# Patient Record
Sex: FEMALE | Race: WHITE | NOT HISPANIC OR LATINO | Employment: OTHER | URBAN - METROPOLITAN AREA
[De-identification: names, ages, dates, MRNs, and addresses within clinical notes are randomized per-mention and may not be internally consistent; named-entity substitution may affect disease eponyms.]

---

## 2018-01-30 LAB
BUN SERPL-MCNC: 30 MG/DL (ref 7–18)
CALCIUM SERPL-MCNC: 9.6 MG/DL (ref 8.5–10.1)
CHLORIDE SERPL-SCNC: 105 MMOL/L (ref 98–107)
CHOLEST SERPL-MCNC: 168 MG/DL
CO2 SERPL-SCNC: 29 MMOL/L (ref 21–32)
CREAT SERPL-MCNC: 1.33 MG/DL (ref 0.6–1.3)
GLUCOSE SERPL-MCNC: 96 MG/DL (ref 74–106)
HDLC SERPL-MCNC: 67 MG/DL (ref 35–60)
LDLC SERPL CALC-MCNC: 73 MG/DL
POTASSIUM SERPL-SCNC: 4.6 MMOL/L (ref 3.5–5.1)
SODIUM SERPL-SCNC: 139 MEQ/L (ref 131–145)
TRIGL SERPL-MCNC: 94 MG/DL (ref 30–150)

## 2018-08-06 ENCOUNTER — HISTORICAL (OUTPATIENT)
Dept: ADMINISTRATIVE | Facility: HOSPITAL | Age: 73
End: 2018-08-06

## 2018-08-06 LAB
APPEARANCE, UA: CLEAR
BACTERIA #/AREA URNS AUTO: ABNORMAL /HPF
BILIRUB UR QL STRIP: NEGATIVE MG/DL
BUN SERPL-MCNC: 23 MG/DL (ref 7–18)
CALCIUM SERPL-MCNC: 9.2 MG/DL (ref 8.5–10)
CHLORIDE SERPL-SCNC: 101 MMOL/L (ref 98–107)
CO2 SERPL-SCNC: 27 MMOL/L (ref 21–32)
COLOR UR: YELLOW
CREAT SERPL-MCNC: 1.17 MG/DL (ref 0.6–1.3)
CREAT/UREA NIT SERPL: 19.7
GLUCOSE (UA): NEGATIVE MG/DL
GLUCOSE SERPL-MCNC: 77 MG/DL (ref 74–106)
HGB UR QL STRIP: ABNORMAL UNIT/L
KETONES UR QL STRIP: NEGATIVE MG/DL
LEUKOCYTE ESTERASE UR QL STRIP: NEGATIVE UNIT/L
NITRITE UR QL STRIP.AUTO: NEGATIVE
PH UR STRIP: 7.5 [PH]
POTASSIUM SERPL-SCNC: 4.2 MMOL/L (ref 3.5–5.1)
PROT UR QL STRIP: NEGATIVE MG/DL
RBC #/AREA URNS HPF: ABNORMAL /HPF
SODIUM SERPL-SCNC: 137 MMOL/L (ref 136–145)
SP GR UR STRIP: 1.01
SQUAMOUS EPITHELIAL, UA: ABNORMAL /LPF
UROBILINOGEN UR STRIP-ACNC: 0.2 MG/DL
WBC #/AREA URNS AUTO: ABNORMAL /[HPF]

## 2019-02-08 ENCOUNTER — HISTORICAL (OUTPATIENT)
Dept: ADMINISTRATIVE | Facility: HOSPITAL | Age: 74
End: 2019-02-08

## 2019-02-08 LAB
ABS NEUT (OLG): 3.1 X10(3)/MCL (ref 2.1–9.2)
ALBUMIN SERPL-MCNC: 4.1 GM/DL (ref 3.4–5)
ALBUMIN/GLOB SERPL: 1.58 {RATIO} (ref 1.5–2.5)
ALP SERPL-CCNC: 48 UNIT/L (ref 38–126)
ALT SERPL-CCNC: 19 UNIT/L (ref 7–52)
APPEARANCE, UA: CLEAR
AST SERPL-CCNC: 26 UNIT/L (ref 15–37)
BACTERIA #/AREA URNS AUTO: ABNORMAL /HPF
BILIRUB SERPL-MCNC: 0.3 MG/DL (ref 0.2–1)
BILIRUB UR QL STRIP: NEGATIVE MG/DL
BILIRUBIN DIRECT+TOT PNL SERPL-MCNC: 0 MG/DL (ref 0–0.5)
BILIRUBIN DIRECT+TOT PNL SERPL-MCNC: 0.3 MG/DL
BUN SERPL-MCNC: 23 MG/DL (ref 7–18)
CALCIUM SERPL-MCNC: 9.4 MG/DL (ref 8.5–10)
CHLORIDE SERPL-SCNC: 103 MMOL/L (ref 98–107)
CHOLEST SERPL-MCNC: 174 MG/DL (ref 0–200)
CHOLEST/HDLC SERPL: 2.8 {RATIO}
CK SERPL-CCNC: 73 UNIT/L (ref 21–232)
CO2 SERPL-SCNC: 21 MMOL/L (ref 21–32)
COLOR UR: YELLOW
CREAT SERPL-MCNC: 1.11 MG/DL (ref 0.6–1.3)
ERYTHROCYTE [DISTWIDTH] IN BLOOD BY AUTOMATED COUNT: 13 % (ref 11.5–17)
GLOBULIN SER-MCNC: 2.6 GM/DL (ref 1.2–3)
GLUCOSE (UA): NEGATIVE MG/DL
GLUCOSE SERPL-MCNC: 106 MG/DL (ref 74–106)
HCT VFR BLD AUTO: 41.7 % (ref 37–47)
HDLC SERPL-MCNC: 62 MG/DL (ref 35–60)
HGB BLD-MCNC: 13.3 GM/DL (ref 12–16)
HGB UR QL STRIP: ABNORMAL UNIT/L
KETONES UR QL STRIP: NEGATIVE MG/DL
LDLC SERPL CALC-MCNC: 76 MG/DL (ref 0–129)
LEUKOCYTE ESTERASE UR QL STRIP: ABNORMAL UNIT/L
LYMPHOCYTES # BLD AUTO: 1.6 X10(3)/MCL (ref 0.6–3.4)
LYMPHOCYTES NFR BLD AUTO: 29.6 % (ref 13–40)
MCH RBC QN AUTO: 30.4 PG (ref 27–31.2)
MCHC RBC AUTO-ENTMCNC: 32 GM/DL (ref 32–36)
MCV RBC AUTO: 95 FL (ref 80–94)
MONOCYTES # BLD AUTO: 0.7 X10(3)/MCL (ref 0.1–1.3)
MONOCYTES NFR BLD AUTO: 12.2 % (ref 0.1–24)
NEUTROPHILS NFR BLD AUTO: 58.2 % (ref 47–80)
NITRITE UR QL STRIP.AUTO: NEGATIVE
PH UR STRIP: 7 [PH]
PLATELET # BLD AUTO: 164 X10(3)/MCL (ref 130–400)
PMV BLD AUTO: 9.5 FL (ref 9.4–12.4)
POTASSIUM SERPL-SCNC: 4.4 MMOL/L (ref 3.5–5.1)
PROT SERPL-MCNC: 6.7 GM/DL (ref 6.4–8.2)
PROT UR QL STRIP: NEGATIVE MG/DL
RBC # BLD AUTO: 4.38 X10(6)/MCL (ref 4.2–5.4)
RBC #/AREA URNS HPF: ABNORMAL /HPF
SODIUM SERPL-SCNC: 140 MMOL/L (ref 136–145)
SP GR UR STRIP: 1.01
SQUAMOUS EPITHELIAL, UA: ABNORMAL /LPF
TRIGL SERPL-MCNC: 97 MG/DL (ref 30–150)
TSH SERPL-ACNC: 1.84 MIU/ML (ref 0.35–4.94)
UROBILINOGEN UR STRIP-ACNC: 0.2 MG/DL
VLDLC SERPL CALC-MCNC: 19.4 MG/DL
WBC # SPEC AUTO: 5.4 X10(3)/MCL (ref 4.5–11.5)
WBC #/AREA URNS AUTO: ABNORMAL /[HPF]

## 2019-08-08 ENCOUNTER — HISTORICAL (OUTPATIENT)
Dept: ADMINISTRATIVE | Facility: HOSPITAL | Age: 74
End: 2019-08-08

## 2019-08-08 LAB
APPEARANCE, UA: CLEAR
BACTERIA #/AREA URNS AUTO: ABNORMAL /HPF
BILIRUB UR QL STRIP: NEGATIVE MG/DL
BUN SERPL-MCNC: 25 MG/DL (ref 7–18)
CALCIUM SERPL-MCNC: 9.7 MG/DL (ref 8.5–10)
CHLORIDE SERPL-SCNC: 103 MMOL/L (ref 98–107)
CO2 SERPL-SCNC: 29 MMOL/L (ref 21–32)
COLOR UR: ABNORMAL
CREAT SERPL-MCNC: 1.26 MG/DL (ref 0.6–1.3)
CREAT/UREA NIT SERPL: 19.8
GLUCOSE (UA): NEGATIVE MG/DL
GLUCOSE SERPL-MCNC: 119 MG/DL (ref 74–106)
HGB UR QL STRIP: ABNORMAL UNIT/L
KETONES UR QL STRIP: NEGATIVE MG/DL
LEUKOCYTE ESTERASE UR QL STRIP: NEGATIVE UNIT/L
NITRITE UR QL STRIP.AUTO: NEGATIVE
PH UR STRIP: 6.5 [PH]
POTASSIUM SERPL-SCNC: 4.1 MMOL/L (ref 3.5–5.1)
PROT UR QL STRIP: NEGATIVE MG/DL
RBC #/AREA URNS HPF: ABNORMAL /HPF
SODIUM SERPL-SCNC: 138 MMOL/L (ref 136–145)
SP GR UR STRIP: <1.005
SQUAMOUS EPITHELIAL, UA: ABNORMAL /LPF
UROBILINOGEN UR STRIP-ACNC: 0.2 MG/DL
WBC #/AREA URNS AUTO: ABNORMAL /[HPF]

## 2020-03-26 ENCOUNTER — HISTORICAL (OUTPATIENT)
Dept: ADMINISTRATIVE | Facility: HOSPITAL | Age: 75
End: 2020-03-26

## 2020-03-26 LAB
ABS NEUT (OLG): 3.6 X10(3)/MCL (ref 2.1–9.2)
ALBUMIN SERPL-MCNC: 4 GM/DL (ref 3.4–5)
ALBUMIN/GLOB SERPL: 1.29 {RATIO} (ref 1.5–2.5)
ALP SERPL-CCNC: 53 UNIT/L (ref 38–126)
ALT SERPL-CCNC: 17 UNIT/L (ref 7–52)
APPEARANCE, UA: ABNORMAL
AST SERPL-CCNC: 22 UNIT/L (ref 15–37)
BACTERIA #/AREA URNS AUTO: ABNORMAL /HPF
BILIRUB SERPL-MCNC: 0.5 MG/DL (ref 0.2–1)
BILIRUB UR QL STRIP: NEGATIVE MG/DL
BILIRUBIN DIRECT+TOT PNL SERPL-MCNC: 0.1 MG/DL (ref 0–0.5)
BILIRUBIN DIRECT+TOT PNL SERPL-MCNC: 0.4 MG/DL
BUN SERPL-MCNC: 22 MG/DL (ref 7–18)
CALCIUM SERPL-MCNC: 9.3 MG/DL (ref 8.5–10)
CHLORIDE SERPL-SCNC: 103 MMOL/L (ref 98–107)
CHOLEST SERPL-MCNC: 174 MG/DL (ref 0–200)
CHOLEST/HDLC SERPL: 2.7 {RATIO}
CK SERPL-CCNC: 83 UNIT/L (ref 21–232)
CO2 SERPL-SCNC: 30 MMOL/L (ref 21–32)
COLOR UR: YELLOW
CREAT SERPL-MCNC: 1.22 MG/DL (ref 0.6–1.3)
ERYTHROCYTE [DISTWIDTH] IN BLOOD BY AUTOMATED COUNT: 13.2 % (ref 11.5–17)
GLOBULIN SER-MCNC: 3.1 GM/DL (ref 1.2–3)
GLUCOSE (UA): NEGATIVE MG/DL
GLUCOSE SERPL-MCNC: 106 MG/DL (ref 74–106)
HCT VFR BLD AUTO: 41 % (ref 37–47)
HDLC SERPL-MCNC: 65 MG/DL (ref 35–60)
HGB BLD-MCNC: 13 GM/DL (ref 12–16)
HGB UR QL STRIP: ABNORMAL UNIT/L
KETONES UR QL STRIP: NEGATIVE MG/DL
LDLC SERPL CALC-MCNC: 79 MG/DL (ref 0–129)
LEUKOCYTE ESTERASE UR QL STRIP: ABNORMAL UNIT/L
LYMPHOCYTES # BLD AUTO: 1.5 X10(3)/MCL (ref 0.6–3.4)
LYMPHOCYTES NFR BLD AUTO: 25.2 % (ref 13–40)
MCH RBC QN AUTO: 29.1 PG (ref 27–31.2)
MCHC RBC AUTO-ENTMCNC: 32 GM/DL (ref 32–36)
MCV RBC AUTO: 92 FL (ref 80–94)
MONOCYTES # BLD AUTO: 0.7 X10(3)/MCL (ref 0.1–1.3)
MONOCYTES NFR BLD AUTO: 12.3 % (ref 0.1–24)
NEUTROPHILS NFR BLD AUTO: 62.5 % (ref 47–80)
NITRITE UR QL STRIP.AUTO: NEGATIVE
PH UR STRIP: 7 [PH]
PLATELET # BLD AUTO: 171 X10(3)/MCL (ref 130–400)
PMV BLD AUTO: 9 FL (ref 9.4–12.4)
POTASSIUM SERPL-SCNC: 4.3 MMOL/L (ref 3.5–5.1)
PROT SERPL-MCNC: 7.1 GM/DL (ref 6.4–8.2)
PROT UR QL STRIP: NEGATIVE MG/DL
RBC # BLD AUTO: 4.46 X10(6)/MCL (ref 4.2–5.4)
RBC #/AREA URNS HPF: ABNORMAL /HPF
SODIUM SERPL-SCNC: 139 MMOL/L (ref 136–145)
SP GR UR STRIP: 1.02
SQUAMOUS EPITHELIAL, UA: ABNORMAL /LPF
TRIGL SERPL-MCNC: 94 MG/DL (ref 30–150)
TSH SERPL-ACNC: 2.03 MIU/ML (ref 0.35–4.94)
UROBILINOGEN UR STRIP-ACNC: 0.2 MG/DL
VLDLC SERPL CALC-MCNC: 18.8 MG/DL
WBC # SPEC AUTO: 5.8 X10(3)/MCL (ref 4.5–11.5)
WBC #/AREA URNS AUTO: ABNORMAL /[HPF]

## 2020-09-28 ENCOUNTER — HISTORICAL (OUTPATIENT)
Dept: ADMINISTRATIVE | Facility: HOSPITAL | Age: 75
End: 2020-09-28

## 2020-09-28 LAB
APPEARANCE, UA: CLEAR
BACTERIA #/AREA URNS AUTO: ABNORMAL /HPF
BILIRUB UR QL STRIP: NEGATIVE MG/DL
BUN SERPL-MCNC: 25 MG/DL (ref 7–18)
CALCIUM SERPL-MCNC: 10.3 MG/DL (ref 8.5–10)
CHLORIDE SERPL-SCNC: 99 MMOL/L (ref 98–107)
CO2 SERPL-SCNC: 30 MMOL/L (ref 21–32)
COLOR UR: YELLOW
CREAT SERPL-MCNC: 1.08 MG/DL (ref 0.6–1.3)
CREAT/UREA NIT SERPL: 23.1
GLUCOSE (UA): NEGATIVE MG/DL
GLUCOSE SERPL-MCNC: 81 MG/DL (ref 74–106)
HGB UR QL STRIP: ABNORMAL UNIT/L
KETONES UR QL STRIP: NEGATIVE MG/DL
LEUKOCYTE ESTERASE UR QL STRIP: NEGATIVE UNIT/L
NITRITE UR QL STRIP.AUTO: NEGATIVE
PH UR STRIP: 6.5 [PH]
POTASSIUM SERPL-SCNC: 4.5 MMOL/L (ref 3.5–5.1)
PROT UR QL STRIP: NEGATIVE MG/DL
RBC #/AREA URNS HPF: ABNORMAL /HPF
SODIUM SERPL-SCNC: 139 MMOL/L (ref 136–145)
SP GR UR STRIP: 1.01
SQUAMOUS EPITHELIAL, UA: ABNORMAL /LPF
UROBILINOGEN UR STRIP-ACNC: 0.2 MG/DL
WBC #/AREA URNS AUTO: ABNORMAL /[HPF]

## 2021-03-30 ENCOUNTER — HISTORICAL (OUTPATIENT)
Dept: ADMINISTRATIVE | Facility: HOSPITAL | Age: 76
End: 2021-03-30

## 2021-03-30 LAB
ABS NEUT (OLG): 3.2 X10(3)/MCL (ref 2.1–9.2)
ALBUMIN SERPL-MCNC: 4.2 GM/DL (ref 3.4–5)
ALBUMIN/GLOB SERPL: 1.68 {RATIO} (ref 1.5–2.5)
ALP SERPL-CCNC: 73 UNIT/L (ref 38–126)
ALT SERPL-CCNC: 16 UNIT/L (ref 7–52)
APPEARANCE, UA: CLEAR
AST SERPL-CCNC: 21 UNIT/L (ref 15–37)
BACTERIA #/AREA URNS AUTO: ABNORMAL /HPF
BILIRUB SERPL-MCNC: 0.6 MG/DL (ref 0.2–1)
BILIRUB UR QL STRIP: NEGATIVE MG/DL
BILIRUBIN DIRECT+TOT PNL SERPL-MCNC: 0.1 MG/DL (ref 0–0.5)
BILIRUBIN DIRECT+TOT PNL SERPL-MCNC: 0.5 MG/DL
BUN SERPL-MCNC: 17 MG/DL (ref 7–18)
CALCIUM SERPL-MCNC: 10.4 MG/DL (ref 8.5–10)
CHLORIDE SERPL-SCNC: 104 MMOL/L (ref 98–107)
CHOLEST SERPL-MCNC: 169 MG/DL (ref 0–200)
CHOLEST/HDLC SERPL: 2.6 {RATIO}
CK SERPL-CCNC: 81 UNIT/L (ref 21–232)
CO2 SERPL-SCNC: 32 MMOL/L (ref 21–32)
COLOR UR: YELLOW
CREAT SERPL-MCNC: 1.17 MG/DL (ref 0.6–1.3)
ERYTHROCYTE [DISTWIDTH] IN BLOOD BY AUTOMATED COUNT: 13.1 % (ref 11.5–17)
GLOBULIN SER-MCNC: 2.5 GM/DL (ref 1.2–3)
GLUCOSE (UA): NEGATIVE MG/DL
GLUCOSE SERPL-MCNC: 100 MG/DL (ref 74–106)
GROUP & RH: NORMAL
HCT VFR BLD AUTO: 37.6 % (ref 37–47)
HDLC SERPL-MCNC: 64 MG/DL (ref 35–60)
HGB BLD-MCNC: 12.4 GM/DL (ref 12–16)
HGB UR QL STRIP: ABNORMAL UNIT/L
KETONES UR QL STRIP: NEGATIVE MG/DL
LDLC SERPL CALC-MCNC: 73 MG/DL (ref 0–129)
LEUKOCYTE ESTERASE UR QL STRIP: NEGATIVE UNIT/L
LYMPHOCYTES # BLD AUTO: 1.3 X10(3)/MCL (ref 0.6–3.4)
LYMPHOCYTES NFR BLD AUTO: 26.9 % (ref 13–40)
MCH RBC QN AUTO: 30.2 PG (ref 27–31.2)
MCHC RBC AUTO-ENTMCNC: 33 GM/DL (ref 32–36)
MCV RBC AUTO: 92 FL (ref 80–94)
MONOCYTES # BLD AUTO: 0.5 X10(3)/MCL (ref 0.1–1.3)
MONOCYTES NFR BLD AUTO: 10.4 % (ref 0.1–24)
NEUTROPHILS NFR BLD AUTO: 62.7 % (ref 47–80)
NITRITE UR QL STRIP.AUTO: NEGATIVE
PH UR STRIP: 7 [PH]
PLATELET # BLD AUTO: 158 X10(3)/MCL (ref 130–400)
PMV BLD AUTO: 9.4 FL (ref 9.4–12.4)
POTASSIUM SERPL-SCNC: 4.1 MMOL/L (ref 3.5–5.1)
PROT SERPL-MCNC: 6.7 GM/DL (ref 6.4–8.2)
PROT UR QL STRIP: NEGATIVE MG/DL
RBC # BLD AUTO: 4.11 X10(6)/MCL (ref 4.2–5.4)
RBC #/AREA URNS HPF: ABNORMAL /HPF
SODIUM SERPL-SCNC: 141 MMOL/L (ref 136–145)
SP GR UR STRIP: 1.01
SQUAMOUS EPITHELIAL, UA: ABNORMAL /LPF
TRIGL SERPL-MCNC: 101 MG/DL (ref 30–150)
TSH SERPL-ACNC: 1.19 MIU/ML (ref 0.35–4.94)
UROBILINOGEN UR STRIP-ACNC: 0.2 MG/DL
VLDLC SERPL CALC-MCNC: 20.2 MG/DL
WBC # SPEC AUTO: 5 X10(3)/MCL (ref 4.5–11.5)
WBC #/AREA URNS AUTO: ABNORMAL /[HPF]

## 2021-09-20 ENCOUNTER — HISTORICAL (OUTPATIENT)
Dept: ADMINISTRATIVE | Facility: HOSPITAL | Age: 76
End: 2021-09-20

## 2021-09-20 LAB
APPEARANCE, UA: ABNORMAL
BACTERIA #/AREA URNS AUTO: ABNORMAL /HPF
BILIRUB UR QL STRIP: NEGATIVE MG/DL
BUN SERPL-MCNC: 27 MG/DL (ref 7–18)
CALCIUM SERPL-MCNC: 9.6 MG/DL (ref 8.5–10)
CHLORIDE SERPL-SCNC: 104 MMOL/L (ref 98–107)
CO2 SERPL-SCNC: 29 MMOL/L (ref 21–32)
COLOR UR: YELLOW
CREAT SERPL-MCNC: 1.16 MG/DL (ref 0.6–1.3)
CREAT/UREA NIT SERPL: 23.3
GLUCOSE (UA): NEGATIVE MG/DL
GLUCOSE SERPL-MCNC: 97 MG/DL (ref 74–106)
HGB UR QL STRIP: NEGATIVE UNIT/L
KETONES UR QL STRIP: ABNORMAL MG/DL
LEUKOCYTE ESTERASE UR QL STRIP: ABNORMAL UNIT/L
NITRITE UR QL STRIP.AUTO: NEGATIVE
PH UR STRIP: 5.5 [PH]
POTASSIUM SERPL-SCNC: 4.3 MMOL/L (ref 3.5–5.1)
PROT UR QL STRIP: NEGATIVE MG/DL
RBC #/AREA URNS HPF: ABNORMAL /HPF
SODIUM SERPL-SCNC: 139 MMOL/L (ref 136–145)
SP GR UR STRIP: 1.02
SQUAMOUS EPITHELIAL, UA: ABNORMAL /LPF
UROBILINOGEN UR STRIP-ACNC: 0.2 MG/DL
WBC #/AREA URNS AUTO: ABNORMAL /[HPF]

## 2022-04-10 ENCOUNTER — HISTORICAL (OUTPATIENT)
Dept: ADMINISTRATIVE | Facility: HOSPITAL | Age: 77
End: 2022-04-10

## 2022-04-26 VITALS
SYSTOLIC BLOOD PRESSURE: 142 MMHG | DIASTOLIC BLOOD PRESSURE: 80 MMHG | HEIGHT: 66 IN | BODY MASS INDEX: 23.7 KG/M2 | WEIGHT: 147.5 LBS

## 2022-05-02 NOTE — HISTORICAL [facility] CERNER
This is a historical note converted from Kumar. Formatting and pictures may have been removed.  Please reference Kumar for original formatting and attached multimedia. Chief Complaint  6 mth ov  History of Present Illness  The patient is a 76 yo white female.? ?Here in clinic for evaluation of hypertension.? The patient reports home blood pressures of admits to not monitoring. ?She had a recent cardiology visit?last week and states her blood pressure was slightly elevated, he?encouraged her?to reevaluate her blood pressure at her follow-up visit today, if continued to be elevated?would need?addressing.? The patient reports 100% compliance with medication.? The patient reports no side effects with medication use.? The patient reports following a low-sodium diet.? The patient reports some cardiovascular exercise implementation.? There is no chest pain or shortness of breath reported with exercise.  Patient has intermittent flight anxiety for which she needs a refill today-no side effects with medication use?and good efficacy reported.  She reports 100% compliance with her statin medication with no side effects. ?Following low-cholesterol diet.  ?   Dr. Palomares- Cardio  Review of Systems  Constitutional:?no weight gain,?no weight loss,?no fatigue,?no fever,?no chills,?no weakness,?no trouble sleeping.  Cardiovascular:?no chest pain or discomfort,?no tightness,?no palpitations,?no SOB with activity,?no difficulty breathing while supine,?no swelling,?no sudden awakening from sleep with SOB.  Respiratory:??no cough,?no sputum,?no coughing up blood,?no SOB,?no wheezing,?no painful breathing.  Physical Exam  Vitals & Measurements  BP:?142/80?  HT:?167.00?cm? WT:?66.900?kg? BMI:?23.99?  General- In NAD, A&O x 4  ?   Respiratory- CTA, No wheezing, No crackles, No rhonchi  ?   Cardiovascular- RRR W/O MGR, Pulses equal throughout  Assessment/Plan  1.?Hyperlipidaemia?E78.5  1. Statin medication is?efficacious with no side  effects  2. Continue medication dosing with no change-12-month prescription given  3.?Low-cholesterol diet?given and educated upon  4. FLP?drawn-to be drawn at next wellness visit  Ordered:  Clinic Follow up, *Est. 03/20/22 3:00:00 CDT, Order for future visit, Hypertension  Hyperlipidaemia  Situational anxiety, [facility] AFP  Office/Outpatient Visit Level 4 Established [id number] PC, Hypertension  Hyperlipidaemia  Situational anxiety, [facility]LINK AMB - AFP, 09/20/21 15:12:00 CDT  ?  2.?Hypertension?I10  1. ?Advocate 100% compliance?with medication regimen?and?low-sodium diet  2. ?Advocate?increased?cardiovascular exercise as tolerated and educated upon  3.? 10% weight loss goal  4.? Monitor blood pressure?daily?with?an antecubital?digital?cuff  5. ?Follow-up in 6 months for?wellness visit  6. ?Future Lab: HTN labs today  7.? Blood pressure?currently within normal limits after repeat?check, she will monitor?blood pressure readings at home for the next week and call?with?readings, parameters reviewed and discussed  Ordered:  Basic Metabolic Panel, Routine collect, 09/20/21 15:12:00 CDT, Blood, Order for future visit, Stop date 09/20/21 15:12:00 CDT, Lab Collect, Hypertension, 09/20/21 15:12:00 CDT  Clinic Follow up, *Est. 03/20/22 3:00:00 CDT, Order for future visit, Hypertension  Hyperlipidaemia  Situational anxiety, [facility] AFP  Clinic Follow-up PRN, 09/20/21 15:12:00 CDT, [facility] AMB - AFP, Future Order  Lab Collection Request, 09/20/21 15:12:00 CDT, [facility] AMB - AFP, 09/20/21 15:12:00 CDT, Hypertension  Office/Outpatient Visit Level 4 Established [id number] PC, Hypertension  Hyperlipidaemia  Situational anxiety, [facility] AMB - AFP, 09/20/21 15:12:00 CDT  Urinalysis no Reflex, Routine collect, Urine, Order for future visit, 09/20/21 15:12:00 CDT, Stop date 09/20/21 15:12:00 CDT, Nurse collect, Hypertension  ?  3.?Situational anxiety?F41.8  1. PRN use of alprazolam for flight anxiety-good efficacy and no side effects  2. We discussed  at length dependency side effects?and use only as needed-she voiced understanding  Ordered:  Clinic Follow up, *Est. 03/20/22 3:00:00 CDT, Order for future visit, Hypertension  Hyperlipidaemia  Situational anxiety, HLink AFP  Office/Outpatient Visit Level 4 Established [id number] PC, Hypertension  Hyperlipidaemia  Situational anxiety, [facility] AMB - AFP, 09/20/21 15:12:00 CDT  ?  Orders:  alPRAzolam, See Instructions, 1 tab(s) po bid prn flight anxiety, # 10 tab(s), 0 Refill(s), Pharmacy: [facility], 167, cm, Height/Length Dosing, 09/20/21 14:44:00 CDT, 66.9, kg, Weight Dosing, 09/20/21 14:44:00 CDT  amLODIPine, See Instructions, TAKE ONE TABLET ONCE DAILY, # 90 tab(s), 1 Refill(s), Pharmacy: [facility], 167, cm, Height/Length Dosing, 09/20/21 14:44:00 CDT, 66.9, kg, Weight Dosing, 09/20/21 14:44:00 CDT  hydrochlorothiazide-lisinopril, See Instructions, TAKE ONE TABLET ONCE DAILY, # 90 tab(s), 1 Refill(s), Pharmacy: [facility], 167, cm, Height/Length Dosing, 09/20/21 14:44:00 CDT, 66.9, kg, Weight Dosing, 09/20/21 14:44:00 CDT  Referrals  Clinic Follow up, *Est. 03/21/22 7:30:00 CDT, Order for future visit, Hyperlipidaemia, [facility]Link AFP  Clinic Follow-up PRN, 09/20/21 15:12:00 CDT, HLINK AMB - AFP, Future Order   Problem List/Past Medical History  Ongoing  Cardiac pacemaker  Hyperlipidaemia  Hypertension  MVP - Mitral valve prolapse  Situational anxiety  Wellness examination  Historical  No qualifying data  Procedure/Surgical History  Colonoscopy (2009)  Cardiac pacemaker  Tonsillectomy and adenoidectomy   Medications  amLODIPine 2.5 mg oral tablet, See Instructions, 1 refills  hydrochlorothiazide-lisinopril 12.5 mg-20 mg oral tablet, See Instructions, 1 refills  METOPROL SUC TAB 25MG ER, 25 mg= 1 tab(s), Oral, BID  pravastatin 20 mg oral tablet, See Instructions, 3 refills  Xanax 0.25 mg oral tablet, See Instructions  Allergies  Prevnar 13?(Swelling)  Social History  Abuse/Neglect  No, 03/30/2021  No,  08/08/2019  Tobacco  Never (less than 100 in lifetime), No, 03/30/2021  Never (less than 100 in lifetime), N/A, 08/08/2019  Never (less than 100 in lifetime), N/A, 02/08/2019  Family History  Heart disease: Father.  Immunizations  Vaccine Date Status   COVID-19 MRNA, LNP-S, PF- Pfizer 02/03/2021 Given   zoster vaccine, inactivated 01/20/2021 Recorded   COVID-19 MRNA, LNP-S, PF- Pfizer 01/13/2021 Given   zoster vaccine, inactivated 10/24/2020 Recorded   influenza virus vaccine, inactivated 10/24/2020 Recorded   tetanus-diphtheria toxoids 03/26/2020 Given   influenza virus vaccine, inactivated 11/08/2019 Recorded   influenza virus vaccine, inactivated 10/01/2018 Recorded   influenza virus vaccine, inactivated 09/29/2017 Recorded   influenza virus vaccine, inactivated 10/14/2016 Recorded   pneumococcal 13-valent conjugate vaccine 2016 Recorded   pneumococcal 23-polyvalent vaccine 2016 Recorded   zoster vaccine live 2015 Recorded   tetanus/diphtheria/pertussis, acel(Tdap) 2010 Recorded   Health Maintenance  Health Maintenance  ???Pending?(in the next year)  ??? ??OverDue  ??? ? ? ?Advance Directive due??01/02/21??and every 1??year(s)  ??? ? ? ?Cognitive Screening due??01/02/21??and every 1??year(s)  ??? ? ? ?Fall Risk Assessment due??01/02/21??and every 1??year(s)  ??? ? ? ?Functional Assessment due??01/02/21??and every 1??year(s)  ??? ? ? ?Aspirin Therapy for CVD Prevention due??03/26/21??and every 1??year(s)  ??? ??Due?  ??? ? ? ?ADL Screening due??09/20/21??and every 1??year(s)  ??? ??Due In Future?  ??? ? ? ?Depression Screening not due until??09/28/21??and every 1??year(s)  ??? ? ? ?Obesity Screening not due until??01/01/22??and every 1??year(s)  ??? ? ? ?Hypertension Management-BMP not due until??03/30/22??and every 1??year(s)  ??? ? ? ?Medicare Annual Wellness Exam not due until??03/30/22??and every 1??year(s)  ??? ? ? ?Hypertension Management-Education not due until??03/30/22??and every 1??year(s)  ??? ? ? ?Bone  Density Screening not due until??04/14/22??and every 2??year(s)  ???Satisfied?(in the past 1 year)  ??? ??Satisfied?  ??? ? ? ?Blood Pressure Screening on??09/20/21.??Satisfied by Cathie Davis NP  ??? ? ? ?Body Mass Index Check on??09/20/21.??Satisfied by Nora Harris MA  ??? ? ? ?Breast Cancer Screening on??03/15/21.??Satisfied by Jackie Orona  ??? ? ? ?Depression Screening on??09/28/20.??Satisfied by Jatin Moulton MD  ??? ? ? ?Diabetes Screening on??03/30/21.??Satisfied by Dale Fan  ??? ? ? ?Hypertension Management-Blood Pressure on??09/20/21.??Satisfied by Cathie Davis NP  ??? ? ? ?Hypertension Management-Education on??03/30/21.??Satisfied by Jatin Moulton MD  ??? ? ? ?Hypertension Management-BMP on??03/30/21.??Satisfied by Dale Fan  ??? ? ? ?Influenza Vaccine on??10/24/20.??Satisfied by Nora Harris MA  ??? ? ? ?Lipid Screening on??03/30/21.??Satisfied by Dale Fan  ??? ? ? ?Medicare Annual Wellness Exam on??03/30/21.??Satisfied by Jatin Moulton MD  ??? ? ? ?Obesity Screening on??09/20/21.??Satisfied by Nora Harris MA  ??? ? ? ?Zoster Vaccine on??01/20/21.??Satisfied by Nora Harris MA  ??? ??Refused?  ??? ? ? ?Influenza Vaccine on??09/28/20.??Recorded by Jatin Moulton MD  ?      The?physician?is present within?the office with the?nurse practitioner.??The?office visit?documentation and management have been?reviewed and agreed upon.? I will continue to follow?this patient along with?the nurse practitioner?for current and future care.

## 2022-05-02 NOTE — HISTORICAL [facility] CERNER
This is a historical note converted from [employer]. Formatting and pictures may have been removed.  Please reference Kumar for original formatting and attached multimedia. Chief Complaint  wellness  History of Present Illness  The patient is a 74 year old white female here today for a complete Wellness physical.? The patient?has?no acute complaints today. The patient also carries a diagnosis of?HTN/HLD-followed?by our clinic, which is assessed today?as stable with no new issues.??She is followed for her?sick sinus syndrome/pacemaker by cardiology Dr. Palomares. ?Exercise is reported as moderate and includes?walking primarily with no chest pain or shortness of breath.?? Diet modifications are reported as?attempting lean proteins and low sodium.?? Previous documented weight is recorded? as charted.  She is also?here in clinic for evaluation of hypertension.? The patient reports home blood pressures of 130s over 80s?at home.? The patient reports 100% compliance with medication.? The patient reports no side effects with medication use.? The patient reports following a low-sodium diet.? The patient reports some cardiovascular exercise implementation.? There is no chest pain or shortness of breath reported with exercise.  She reports 100% compliance with her statin medication with no side effects?and following a low-cholesterol diet. ?No change to regimen desired.  She follows with her cardiologist closely for history of?sick sinus syndrome/cardiac pacemaker/MVP. ?Asymptomatic today.  Review of Systems  Constitutional:?no weight gain,?no weight loss,?no fatigue,?no fever,?no chills,?no weakness,?no trouble sleeping.  Eyes:?no vision loss/changes,?no glasses or contacts,?no pain,?no redness,?no blurry or double vision,?no flashing lights,?no specks,?no glaucoma,?no cataracts.  Last eye exam:?within last 6 months  Head:?no headache,?no head injury,?no neck pain.?  Neck:??no lumps,?no swollen glands,?no stiffness.  Ears:?no  decreased hearing,?no ringing,?no earache,?no drainage.?  Nose:?no stuffiness,?no discharge,?no itching,?no hay fever,?no nosebleeds,?no sinus pain.  Throat:?no bleeding,?no dentures,?no sore tongue,?no dry mouth,?no sore throat,?no hoarseness,?no thrush,?no non-healing sores.  Cardiovascular:?no chest pain or discomfort,?no tightness,?no palpitations,?no SOB with activity,?no difficulty breathing while supine,?no swelling,?no sudden awakening from sleep with SOB.  Vascular:?no calf pain with walking,?no leg cramping.  Respiratory:??no cough,?no sputum,?no coughing up blood,?no SOB,?no wheezing,?no painful breathing.  Gastrointestinal:?no swallowing difficulty,?no heartburn,?no change in appetite,?no nausea,?no change in bowel habits,?no rectal bleeding,?no constipation,?no diarrhea,?no yellow eyes or skin.  Urinary:?no frequency,?no urgency,?no burning or pain,?no blood in urine,?no incontinence,?no change in urinary strength.  Musculoskeletal:?no muscle or joint pain,?no stiffness,?no back pain,?no redness of joints,?no swelling of joints,?no trauma.  Skin:?no rashes,?no lumps,?no itching,?no dryness,?color normal for ethnicity,?no hair or nail changes.  Neurologic:?no dizziness,?no fainting,?no seizures,?no weakness,?no numbness,?no tingling,?no tremors.  Psychiatric:?no nervousness,?no stress,?no depression,?no memory loss.  Endocrine:?no heat or cold intolerance,?no sweating,?no frequent urination,?no thirst,?no change in appetite.  Hematologic:?no ease of bruising,?no ease of bleeding.  ?  ?  ?  ?  Physical Exam  Vitals & Measurements  BP:?135/75?  BMI:?23.59?  VITAL SIGNS:? Reviewed.? ?  GENERAL:? In?no apparent distress.? Alert and Oriented x3  HEAD:?No signsof head trauma. Normocephalic  EYES:? Pupils?equal/round/reactive.? Extraocular motionsintact.  EARS:? Hearing?grossly intact. TMs and EAC?clear  MOUTH:??Oropharynx is clear.?No erythema. No exudates  NECK:? No LAD. No JVD. No thyromegaly. No  bruits  CHEST:? Chest with clear breath sounds bilaterally.? No wheezes, rales, or rhonchi. Good air movement  CARDIAC:? Regular rate and rhythm.? S1 and S2, without murmurs, gallops, or rubs.  VASCULAR:??No Edema.? Peripheral pulses normal and equal in all extremities.  ABDOMEN:?Soft, without detectable tenderness.??No sign of distention.?No rebound or guarding, and no masses palpated.? ?Bowel Sounds present and normal x 4.  MUSCULOSKELETAL:??Good range of motion of all major joints.?5/5 strength throughout. Extremities without clubbing, cyanosis or edema.  NEUROLOGIC EXAM:? Alert and oriented x 3.? No focal sensory or strength deficits.? ?Speech normal.? Follows commands.  PSYCHIATRIC:? Mood normal.  SKIN:??No rash or lesions.  Assessment/Plan  1.?Wellness examination?Z00.00  ?Assessment/Plan:  ?   1.?Wellness  ?   -Health and Exercise Prescription issued and educated upon  ?   -10% weight loss goal  ?   -Lifestyle counseling >20minutes  ?   -Diet: Low-sodium/cardiac  ?   -Screening: GYN screening-breast/Pap/mammogram up-to-date with Dr. Rollins-we will request records.? She is actually done with Pap per age?and guidelines but she will continue to acquire her Pap with her GYN as long as he recommends it. ?Colonoscopy done per age.? We will inquire with the patient regarding DEXA scan-if she has not got 1, we will order it.  ?   -Vaccines: TD vaccine with ABN today; Shingrix No. 1 and #2 sent to [facility]  ?   -Labs:?See below  ?   2.Comorbidities:?See below  ?  3. Referrals:?None  ?  4. RTC: 12-month wellness and 6-month HTN  ?  Ordered:  Medicare Annual Wellness- Subsequent  PC, Wellness examination  Hypertension  Hyperlipidaemia, [facility]LINK AMB - AFP, 03/26/20 8:37:00 CDT  ?  2.?Hypertension?I10  ?  Essential Hypertension:?At goal per?JNC 8 guidelines.? No change in medication regimen. ?6-month prescription written-see below  ?   1. ?Advocate 100% compliance?with medication regimen?and?low-sodium  diet  2. ?Advocate?increased?cardiovascular exercise as tolerated and educated upon  3.? 10% weight loss goal  4.? Monitor blood pressure?daily?with?an antecubital?digital?cuff  5. ?Follow-up in 6 months for?HTN visit  6. ?Future Lab: HTN labs in 6 months  ?  ?  ?  ?  Ordered:  amLODIPine, 2.5 mg = 1 tab(s), Oral, Daily, # 90 tab(s), 1 Refill(s), Pharmacy: [facility], 167, cm, Height/Length Dosing, 08/08/19 8:55:00 CDT, 67, kg, Weight Dosing, 08/08/19 8:55:00 CDT  hydrochlorothiazide-lisinopril, 1 tab(s), Oral, Daily, # 90 tab(s), 1 Refill(s), Pharmacy: [facility], 167, cm, Height/Length Dosing, 08/08/19 8:55:00 CDT, 67, kg, Weight Dosing, 08/08/19 8:55:00 CDT  CBC w/ Auto Diff, Routine collect, 03/26/20 8:37:00 CDT, Blood, Order for future visit, Stop date 03/26/20 8:37:00 CDT, Lab Collect, Hypertension  Hyperlipidaemia, 03/26/20 8:37:00 CDT  Clinic Follow up, *Est. 09/26/20 3:00:00 CDT, Order for future visit, Hypertension  Hyperlipidaemia, HLink AFP  Comprehensive Metabolic Panel, Routine collect, 03/26/20 8:37:00 CDT, Blood, Order for future visit, Stop date 03/26/20 8:37:00 CDT, Lab Collect, Hypertension  Hyperlipidaemia, 03/26/20 8:37:00 CDT  Lab Collection Request, 03/26/20 8:39:00 CDT, HLINK AMB - AFP, 03/26/20 8:39:00 CDT, Hypertension  Hyperlipidaemia  Lipid Panel, Routine collect, *Est. 03/26/20 3:00:00 CDT, Blood, Order for future visit, *Est. Stop date 03/26/20 3:00:00 CDT, Lab Collect, Hypertension  Hyperlipidaemia, 03/26/20 8:37:00 CDT  Medicare Annual Wellness- Subsequent  PC, Wellness examination  Hypertension  Hyperlipidaemia, HLINK AMB - AFP, 03/26/20 8:37:00 CDT  Thyroid Stimulating Hormone, Routine collect, 03/26/20 8:37:00 CDT, Blood, Order for future visit, Stop date 03/26/20 8:37:00 CDT, Lab Collect, Hypertension, 03/26/20 8:37:00 CDT  Urinalysis without Reflex, Routine collect, Urine, Order for future visit, 03/26/20 8:37:00 CDT, Stop date 03/26/20 8:37:00 CDT, Nurse  collect, Hypertension  ?  3.?Hyperlipidaemia?E78.5  ??-Statin medication is?efficacious with no side effects  -Continue medication dosing with no change-12-month prescription given  -Low-cholesterol diet?given and educated upon  -FLP?[id number]  Ordered:  pravastatin, 20 mg = 1 tab(s), Oral, Once a day (at bedtime), # 90 tab(s), 3 Refill(s), Pharmacy: [facility], 167, cm, Height/Length Dosing, 08/08/19 8:55:00 CDT, 67, kg, Weight Dosing, 08/08/19 8:55:00 CDT  CBC w/ Auto Diff, Routine collect, 03/26/20 8:37:00 CDT, Blood, Order for future visit, Stop date 03/26/20 8:37:00 CDT, Lab Collect, Hypertension  Hyperlipidaemia, 03/26/20 8:37:00 CDT  Clinic Follow up, *Est. 09/26/20 3:00:00 CDT, Order for future visit, Hypertension  Hyperlipidaemia, HLink AFP  Comprehensive Metabolic Panel, Routine collect, 03/26/20 8:37:00 CDT, Blood, Order for future visit, Stop date 03/26/20 8:37:00 CDT, Lab Collect, Hypertension  Hyperlipidaemia, 03/26/20 8:37:00 CDT  Creatine Kinase, Routine collect, 03/26/20 8:37:00 CDT, Blood, Order for future visit, Stop date 03/26/20 8:37:00 CDT, Lab Collect, Hyperlipidaemia, 03/26/20 8:37:00 CDT  Lab Collection Request, 03/26/20 8:39:00 CDT, HLINK AMB - AFP, 03/26/20 8:39:00 CDT, Hypertension  Hyperlipidaemia  Lipid Panel, Routine collect, *Est. 03/26/20 3:00:00 CDT, Blood, Order for future visit, *Est. Stop date 03/26/20 3:00:00 CDT, Lab Collect, Hypertension  Hyperlipidaemia, 03/26/20 8:37:00 CDT  Medicare Annual Wellness- Subsequent  PC, Wellness examination  Hypertension  Hyperlipidaemia, HLINK AMB - AFP, 03/26/20 8:37:00 CDT  ?  Orders:  tetanus-diphth toxoids (Td) adult/adol, 0.5 mL, IM, Once, first dose 03/26/20 8:37:00 CDT, stop date 03/26/20 8:37:00 CDT  -Follow-up with cardiology for pacemaker and history of sick sinus syndrome.  Referrals  Clinic Follow up, *Est. 09/26/20 3:00:00 CDT, Order for future visit, Hypertension  Hyperlipidaemia, HLink AFP   Problem  List/Past Medical History  Ongoing  Cardiac pacemaker  Hyperlipidaemia  Hypertension  MVP - Mitral valve prolapse  Wellness examination  Historical  No qualifying data  Procedure/Surgical History  Colonoscopy (2009)  Cardiac pacemaker  Tonsillectomy and adenoidectomy   Medications  alPRAzolam 0.25 mg oral tab, 0.25 mg= 1 tab(s), Oral, Daily  amLODIPine 2.5 mg oral tablet, 2.5 mg= 1 tab(s), Oral, Daily, 1 refills  hydrochlorothiazide-lisinopril 12.5 mg-20 mg oral tablet, 1 tab(s), Oral, Daily, 1 refills  METOPROL SUC TAB 25MG ER, 25 mg= 1 tab(s), Oral, BID  pravastatin 20 mg oral tablet, 20 mg= 1 tab(s), Oral, Once a day (at bedtime), 3 refills  Allergies  Prevnar 13?(Swelling)  Social History  Abuse/Neglect  No, 08/08/2019  Tobacco  Never (less than 100 in lifetime), N/A, 08/08/2019  Never (less than 100 in lifetime), N/A, 02/08/2019  Family History  Heart disease: Father.  Immunizations  Vaccine Date Status   tetanus-diphtheria toxoids 03/26/2020 Given   influenza virus vaccine, inactivated 10/01/2018 Recorded   influenza virus vaccine, inactivated 09/29/2017 Recorded   influenza virus vaccine, inactivated 10/14/2016 Recorded   pneumococcal 13-valent conjugate vaccine 2016 Recorded   pneumococcal 23-polyvalent vaccine 2016 Recorded   zoster vaccine live 2015 Recorded   tetanus/diphtheria/pertussis, acel(Tdap) 2010 Recorded   Health Maintenance  Health Maintenance  ???Pending?(in the next year)  ??? ??OverDue  ??? ? ? ?Advance Directive due??01/01/20??and every 1??year(s)  ??? ? ? ?Cognitive Screening due??01/01/20??and every 1??year(s)  ??? ? ? ?Fall Risk Assessment due??01/01/20??and every 1??year(s)  ??? ? ? ?Functional Assessment due??01/01/20??and every 1??year(s)  ??? ? ? ?Geriatric Depression Screening due??01/01/20??and every 1??year(s)  ??? ??Due?  ??? ? ? ?ADL Screening due??03/26/20??and every 1??year(s)  ??? ? ? ?Bone Density Screening due??03/26/20??Variable frequency  ??? ??Due In Future?  ??? ? ?  ?Hypertension Management-BMP not due until??08/07/20??and every 1??year(s)  ??? ? ? ?Obesity Screening not due until??01/01/21??and every 1??year(s)  ???Satisfied?(in the past 1 year)  ??? ??Satisfied?  ??? ? ? ?Aspirin Therapy for CVD Prevention on??03/26/20.??Satisfied by Jatin Moulton MD  ??? ? ? ?Blood Pressure Screening on??03/26/20.??Satisfied by Jatin Moulton MD  ??? ? ? ?Body Mass Index Check on??03/26/20.??Satisfied by Nora Harris MA  ??? ? ? ?Diabetes Screening on??08/08/19.??Satisfied by Lucía Mesa  ??? ? ? ?Hypertension Management-Education on??03/26/20.??Satisfied by Jatin Moulton MD  ??? ? ? ?Medicare Annual Wellness Exam on??03/26/20.??Satisfied by Jatin Moulton MD  ??? ? ? ?Obesity Screening on??03/26/20.??Satisfied by Nora Harris MA  ??? ? ? ?Tetanus Vaccine on??03/26/20.??Satisfied by Nora Harris MA  ?

## 2022-05-02 NOTE — HISTORICAL [facility] CERNER
This is a historical note converted from [employer]. Formatting and pictures may have been removed.  Please reference Kumar for original formatting and attached multimedia. Chief Complaint  wellness  History of Present Illness  The patient is a 73 year old white female here today for a complete Wellness physical.? The patient?has?no acute complaints today. The patient also carries a diagnosis of?HTN/HLD/situational anxiety, which is assessed today.? Exercise is reported as moderate and includes?walking and weights with no chest pain or shortness of breath.?? Diet modifications are reported as?lean proteins of vegetables and low-sodium low-cholesterol.?? Previous documented weight is recorded? as charted.  She is also ??here in clinic for evaluation of hypertension.? The patient reports home blood pressures of 120s over 70s.? The patient reports 100% compliance with medication.? The patient reports no side effects with medication use.? The patient reports following a low-sodium diet.? The patient reports some cardiovascular exercise implementation.? There is no chest pain or shortness of breath reported with exercise.  The patient reports 100% compliance with statin medication and hyperlipidemic diet with no side effects. ?The patient reports only as needed use of Xanax for situational anxiety with no side effects and good efficacy.  Review of Systems  Constitutional:?no weight gain,?no weight loss,?no fatigue,?no fever,?no chills,?no weakness,?no trouble sleeping.  Eyes:?no vision loss/changes,?no glasses or contacts,?no pain,?no redness,?no blurry or double vision,?no flashing lights,?no specks,?no glaucoma,?no cataracts.  Last eye exam:?within last 6 months  Head:?no headache,?no head injury,?no neck pain.?  Neck:??no lumps,?no swollen glands,?no stiffness.  Ears:?no decreased hearing,?no ringing,?no earache,?no drainage.?  Nose:?no stuffiness,?no discharge,?no itching,?no hay fever,?no nosebleeds,?no sinus  pain.  Throat:?no bleeding,?no dentures,?no sore tongue,?no dry mouth,?no sore throat,?no hoarseness,?no thrush,?no non-healing sores.  Cardiovascular:?no chest pain or discomfort,?no tightness,?no palpitations,?no SOB with activity,?no difficulty breathing while supine,?no swelling,?no sudden awakening from sleep with SOB.  Vascular:?no calf pain with walking,?no leg cramping.  Respiratory:??no cough,?no sputum,?no coughing up blood,?no SOB,?no wheezing,?no painful breathing.  Gastrointestinal:?no swallowing difficulty,?no heartburn,?no change in appetite,?no nausea,?no change in bowel habits,?no rectal bleeding,?no constipation,?no diarrhea,?no yellow eyes or skin.  Urinary:?no frequency,?no urgency,?no burning or pain,?no blood in urine,?no incontinence,?no change in urinary strength.  Musculoskeletal:?no muscle or joint pain,?no stiffness,?no back pain,?no redness of joints,?no swelling of joints,?no trauma.  Skin:?no rashes,?no lumps,?no itching,?no dryness,?color normal for ethnicity,?no hair or nail changes.  Neurologic:?no dizziness,?no fainting,?no seizures,?no weakness,?no numbness,?no tingling,?no tremors.  Psychiatric:?no nervousness,?no stress,?no depression,?no memory loss.  Endocrine:?no heat or cold intolerance,?no sweating,?no frequent urination,?no thirst,?no change in appetite.  Hematologic:?no ease of bruising,?no ease of bleeding.  ?  ?  ?  ?  Physical Exam  Vitals & Measurements  BP:?132/80?  HT:?167?cm? WT:?67.2?kg? BMI:?24.1?  VITAL SIGNS:? Reviewed.? ?  GENERAL:? In?no apparent distress.? Alert and Oriented x3  HEAD:?No signsof head trauma. Normocephalic  EYES:? Pupils?equal/round/reactive.? Extraocular motionsintact.  EARS:? Hearing?grossly intact. TMs and EAC?clear  MOUTH:??Oropharynx is clear.?No erythema. No exudates  NECK:? No LAD. No JVD. No thyromegaly. No bruits  CHEST:? Chest with clear breath sounds bilaterally.? No wheezes, rales, or rhonchi. Good air movement  CARDIAC:? Regular  rate and rhythm.? S1 and S2, without murmurs, gallops, or rubs.  VASCULAR:??No Edema.? Peripheral pulses normal and equal in all extremities.  ABDOMEN:?Soft, without detectable tenderness.??No sign of distention.?No rebound or guarding, and no masses palpated.? ?Bowel Sounds present and normal x 4.  MUSCULOSKELETAL:??Good range of motion of all major joints.?5/5 strength throughout. Extremities without clubbing, cyanosis or edema.  NEUROLOGIC EXAM:? Alert and oriented x 3.? No focal sensory or strength deficits.? ?Speech normal.? Follows commands.  PSYCHIATRIC:? Mood normal.  SKIN:??No rash or lesions.  Assessment/Plan  1.?Wellness examination?Z00.00  ?Assessment/Plan:  ?   1.?Wellness  -Health and Exercise Prescription issued and educated upon  -10% weight loss goal  -Lifestyle counseling >20minutes  -Diet:?Lean proteins/low-sodium/low-cholesterol  -Screening:?Up-to-date-GYN provider has her up-to-date on all GYN; colonoscopy done/complete  -Vaccines:?Shing Grix when available-patient defers currently  -Labs:?See below  ?   2.Comorbidities:?See below  ?   3. Referrals:?None  ?   4. RTC:?12-month wellness and 6-month HTN  ?  Ordered:   Medicare Subsequent Wellness PC[facility] 02/08[facility] 8:34:00 CST  ?  2.?Hypertension?I10  ?  Essential Hypertension: At goal per JNC 8 guidelines/no change to medication regimen/six-month prescription written-see below  ?   1. ?Advocate 100% compliance?with medication regimen?and?low-sodium diet  2. ?Advocate?increased?cardiovascular exercise as tolerated and educated upon  3.? 10% weight loss goal  4.? Monitor blood pressure?daily?with?an antecubital?digital?cuff  5. ?Follow-up in 6 months for?HTN visit  6. ?Future Lab:?HTN labs in 6 months  ?  ?  ?  ?  Ordered:  amLODIPine, 2.5 mg = 1 tab(s), Oral, Daily, # 90 tab(s), 1 Refill(s), Pharmacy: [facility] [place]  hydrochlorothiazide-lisinopril, 1 tab(s), Oral, Daily, # 90 tab(s), 1  Refill(s), Pharmacy: [facility] [place]  Clinic Follow up, *Est. 08/08/19 9:00:00 CDT, Order for future visit, Hypertension, [facility]  Comprehensive Metabolic Panel, Routine collect, 02/08/19 8:41:00 CST, Blood, Stop date 02/08/19 8:41:00 CST, Lab Collect, Hypertension, 02/08/19 8:41:00 CST  Thyroid Stimulating Hormone, Routine collect, 02/08/19 8:41:00 CST, Blood, Stop date 02/08/19 8:41:00 CST, Lab Collect, Hypertension  Situational anxiety, 02/08/19 8:41:00 CST  Urinalysis Complete no reflex, Routine collect, Urine, 02/08/19 8:41:00 CST, Stop date 02/08/19 8:41:00 CST, Nurse collect, Hypertension  ?  3.?Hyperlipidaemia?E78.5  ?-Continue low-cholesterol diet/cholesterol diet given  -FLP today  -Pravastatin 20 mg no change 12-month prescription written  Ordered:  pravastatin, 20 mg = 1 tab(s), Oral, Once a day (at bedtime), # 90 tab(s), 3 Refill(s), Pharmacy: [facility] [place], [place]  Creatine Kinase, Routine collect, 02/08/19 8:41:00 CST, Blood, Stop date 02/08/19 8:41:00 CST, Lab Collect, Hyperlipidaemia, 02/08/19 8:41:00 CST  Lipid Panel, Routine collect, 02/08/19 8:41:00 CST, Blood, Stop date 02/08/19 8:41:00 CST, Lab Collect, Hyperlipidaemia, 02/08/19 8:41:00 CST  ?  4.?Situational anxiety?F41.8  ?-Controlled with only as needed use of alprazolam/0.25 mg?1 tab by mouth daily as needed anxiety  -Relaxation techniques discussed at length  -Monitor for symptom change  Ordered:  alPRAzolam, 0.25 mg = 1 tab(s), Oral, Daily, # 30 tab(s), 1 Refill(s), Pharmacy: [facility] - [place], [place]  Thyroid Stimulating Hormone, Routine collect, 02/08/19 8:41:00 CST, Blood, Stop date 02/08/19 8:41:00 CST, Lab Collect, Hypertension  Situational anxiety, 02/08/19 8:41:00 CST  ?   Problem List/Past Medical History  Ongoing  Cardiac pacemaker  Hyperlipidaemia  Hypertension  MVP - Mitral valve prolapse  Historical  No qualifying data  Procedure/Surgical History  Colonoscopy (2009)  Cardiac  pacemaker  Tonsillectomy and adenoidectomy   Medications  alPRAzolam 0.25 mg oral tab, 0.25 mg= 1 tab(s), Oral, Daily, 1 refills  amLODIPine 2.5 mg oral tablet, 2.5 mg= 1 tab(s), Oral, Daily, 1 refills  hydrochlorothiazide-lisinopril 12.5 mg-20 mg oral tablet, 1 tab(s), Oral, Daily, 1 refills  METOPROL SUC TAB 25MG ER, 25 mg= 1 tab(s), Oral, BID  pravastatin 20 mg oral tablet, 20 mg= 1 tab(s), Oral, Once a day (at bedtime), 3 refills  Allergies  Prevnar 13?(Swelling)  Social History  Tobacco  Never (less than 100 in lifetime), N/A, 02/08/2019  Family History  Heart disease: Father.  Immunizations  Vaccine Date Status   influenza virus vaccine, inactivated 10/01/2018 Recorded   influenza virus vaccine, inactivated 09/29/2017 Recorded   influenza virus vaccine, inactivated 10/14/2016 Recorded   pneumococcal 13-valent conjugate vaccine 2016 Recorded   pneumococcal 23-polyvalent vaccine 2016 Recorded   zoster vaccine live 2015 Recorded   tetanus/diphtheria/pertussis, acel(Tdap) 2010 Recorded   Health Maintenance  Health Maintenance  ???Pending?(in the next year)  ??? ??Due?  ??? ? ? ?ADL Screening due??02/08/19??and every 1??year(s)  ??? ? ? ?Advance Directive due??02/08/19??and every 1??year(s)  ??? ? ? ?Alcohol Misuse Screening due??02/08/19??and every 1??year(s)  ??? ? ? ?Bone Density Screening due??02/08/19??Variable frequency  ??? ? ? ?Cognitive Screening due??02/08/19??and every 1??year(s)  ??? ? ? ?Fall Risk Assessment due??02/08/19??and every 1??year(s)  ??? ? ? ?Functional Assessment due??02/08/19??and every 1??year(s)  ??? ? ? ?Geriatric Depression Screening due??02/08/19??and every 1??year(s)  ??? ? ? ?Smoking Cessation due??02/08/19??Variable frequency  ??? ??Due In Future?  ??? ? ? ?Hypertension Management-BMP not due until??08/06/19??and every 1??year(s)  ??? ? ? ?Colorectal Screening (Senior Wellness) not due until??09/29/19??and every 10??year(s)  ??? ? ? ?Tetanus Vaccine not due until??01/01/20??and  every 10??year(s)  ???Satisfied?(in the past 1 year)  ??? ??Satisfied?  ??? ? ? ?Aspirin Therapy for CVD Prevention on??02/08/19.??Satisfied by Jatin Moulton MD  ??? ? ? ?Blood Pressure Screening on??02/08/19.??Satisfied by Nora Harris MA  ??? ? ? ?Body Mass Index Check on??02/08/19.??Satisfied by Nora Harris MA  ??? ? ? ?Diabetes Screening on??08/06/18.??Satisfied by Dale Fan  ??? ? ? ?Hypertension Management-BMP on??08/06/18.??Satisfied by Dale Fan  ??? ? ? ?Hypertension Management-Blood Pressure on??02/08/19.??Satisfied by Nora Harris MA  ??? ? ? ?Hypertension Management-Education on??02/08/19.??Satisfied by Jatin Moulton MD  ??? ? ? ?Influenza Vaccine on??10/01/18.??Satisfied by Nora Harris MA  ??? ? ? ?Obesity Screening on??02/08/19.??Satisfied by Nora Harris MA  ?  ?

## 2022-05-02 NOTE — HISTORICAL [facility] CERNER
This is a historical note converted from [employer]. Formatting and pictures may have been removed.  Please reference Kumar for original formatting and attached multimedia. Chief Complaint  6 mth htn ov  History of Present Illness  The patient is a 72yo white female.? ?Here in clinic for evaluation of hypertension.? The patient reports home blood pressures of 130s over 80s.? The patient reports 100% compliance with medication.? The patient reports no side effects with medication use.? The patient reports following a low-sodium diet.? The patient reports some cardiovascular exercise implementation.? There is no chest pain or shortness of breath reported with exercise.  Patient has intermittent flight anxiety for which she needs a refill today-no side effects with medication use?and good efficacy reported.  She also reports a?left groin/labia?lesion x3 weeks. ?She has been traveling and noticed it?as a red bump that had some mild discharge. ?It is since ruptured and become smaller. ?No fever chills.? No dysuria.? No trauma.  ?  Jimi Ingraldi-cardiology  Review of Systems  Constitutional_no fever chills,?no unintentional weight loss  Eye_  ENMT_  Respiratory_no shortness of breath or cough  Cardiovascular_no chest pain?or shortness of breath  Gastrointestinal_  Genitourinary_  Hema/Lymph_  Endocrine_  Immunologic_  Musculoskeletal_  Integumentary_as per HPI  Neurologic_  All Other ROS_negative  Physical Exam  Vitals & Measurements  BP:?136/74?  HT:?167?cm? WT:?67?kg? BMI:?24.02?  VITAL SIGNS:? Reviewed.? ?  GENERAL:? In?no apparent distress.? Alert and Oriented x3  CHEST:? Chest with clear breath sounds bilaterally.??No wheezes, rales, or rhonchi. Good air movement  CARDIAC:??Regular rate and rhythm.? S1 and S2,?without murmurs, gallops, or rubs.  ABDOMINAL: Normal active BS X all 4 quadrants. Nontender. Nondistended.  NEUROLOGIC EXAM:? Alert and oriented x 3.? No focal sensory or strength deficits.? ?Speech normal.?  Follows commands.  MUSCULOSKELATAL: Full range of motion.? 5 out of 5 strength throughout.  SKIN: 5 x 5 mm area of erythema with central crusting?and some mild skin sloughing?around the perimeter. ?No fluctuance. ?Nontender.? No increased temperature.  PSYCHIATRIC:? Mood normal.  ?  ?  Assessment/Plan  1.?Hypertension?I10  ?  Essential Hypertension:?At goal per?JNC 8 guidelines.? No change in medication regimen. ?6-month prescription written-see below  ?   1. ?Advocate 100% compliance?with medication regimen?and?low-sodium diet  2. ?Advocate?increased?cardiovascular exercise as tolerated and educated upon  3.? 10% weight loss goal  4.? Monitor blood pressure?daily?with?an antecubital?digital?cuff  5. ?Follow-up in 6 months for?wellness visit  6. ?Future Lab: HTN labs today  ?  ?  ?  ?  Ordered:  amLODIPine, 2.5 mg = 1 tab(s), Oral, Daily, # 90 tab(s), 1 Refill(s), [facility]  hydrochlorothiazide-lisinopril, 1 tab(s), Oral, Daily, # 90 tab(s), 1 Refill(s), [facility]  Basic Metabolic Panel, Now collect, 08/08/19 9:10:00 CDT, Blood, Order for future visit, Stop date 08/08/19 9:10:00 CDT, Lab Collect, Hypertension, 08/08/19 9:10:00 CDT  Office/Outpatient Visit Level 4 Established 43136 PC, Hypertension  Situational anxiety  Folliculitis, [facility], 08/08/19 9:14:00 CDT  Urinalysis Complete no reflex, Routine collect, Urine, Order for future visit, 08/08/19 9:10:00 CDT, Stop date 08/08/19 9:10:00 CDT, Nurse collect, Hypertension  ?  2.?Situational anxiety?F41.8  ?-PRN use of alprazolam for flight anxiety-good efficacy and no side effects  -We discussed at length dependency side effects?and use only as needed-she voiced understanding  Ordered:  alPRAzolam, 0.25 mg = 1 tab(s), Oral, Daily, PRN PRN as needed for anxiety, # 30 tab(s), 0 Refill(s), Pharmacy: [facility] [place] [place]  Office/Outpatient Visit Level 4 Established 11673 ,  Hypertension  Situational anxiety  Folliculitis, [facility] - [facility], 08/08/19 9:14:00 CDT  ?  3.?Folliculitis?L73.9  ?-Chaperone used in room during examination of right labia?and right groin  -No indication for I&D or oral medications at this time  -Mupirocin?topical ointment twice daily x10 days  -Monitor for symptom change and follow-up if not improving  Ordered:  mupirocin topical, 1 ebonie, TOP, BID, X 10 day(s), # 22 gm, 1 Refill(s), Pharmacy: [facility] [employer] [place], [place]  Office/Outpatient Visit Level 4 Established 93508 , Hypertension  Situational anxiety  Folliculitis, [facility], 08/08/19 9:14:00 CDT  ?  Orders:  Clinic Follow up, *Est. 02/08/20 3:00:00 CST, wellness, Order for future visit, Wellness examination, [employer]  Lab Collection Request, 08/08/19 9:10:00 CDT, [facility], 08/08/19 9:10:00 CDT  Referrals  Clinic Follow up, *Est. 02/08/20 3:00:00 CST, wellness, Order for future visit, Wellness examination, [employer]   Problem List/Past Medical History  Ongoing  Cardiac pacemaker  Hyperlipidaemia  Hypertension  MVP - Mitral valve prolapse  Historical  No qualifying data  Procedure/Surgical History  Colonoscopy (2009)  Cardiac pacemaker  Tonsillectomy and adenoidectomy   Medications  alPRAzolam 0.25 mg oral tab, 0.25 mg= 1 tab(s), Oral, Daily, PRN  amLODIPine 2.5 mg oral tablet, 2.5 mg= 1 tab(s), Oral, Daily, 1 refills  hydrochlorothiazide-lisinopril 12.5 mg-20 mg oral tablet, 1 tab(s), Oral, Daily, 1 refills  METOPROL SUC TAB 25MG ER, 25 mg= 1 tab(s), Oral, BID  mupirocin 2% topical ointment, 1 ebonie, TOP, BID, 1 refills  pravastatin 20 mg oral tablet, 20 mg= 1 tab(s), Oral, Once a day (at bedtime), 3 refills  Allergies  Prevnar 13?(Swelling)  Social History  Abuse/Neglect  No, 08/08/2019  Tobacco  Never (less than 100 in lifetime), N/A, 08/08/2019  Never (less than 100 in lifetime), N/A, 02/08/2019  Family History  Heart disease: Father.  Immunizations  Vaccine Date Status   influenza  virus vaccine, inactivated 10/01/2018 Recorded   influenza virus vaccine, inactivated 09/29/2017 Recorded   influenza virus vaccine, inactivated 10/14/2016 Recorded   pneumococcal 13-valent conjugate vaccine 2016 Recorded   pneumococcal 23-polyvalent vaccine 2016 Recorded   zoster vaccine live 2015 Recorded   tetanus/diphtheria/pertussis, acel(Tdap) 2010 Recorded   Health Maintenance  Health Maintenance  ???Pending?(in the next year)  ??? ??OverDue  ??? ? ? ?Advance Directive due??01/01/19??and every 1??year(s)  ??? ? ? ?Cognitive Screening due??01/01/19??and every 1??year(s)  ??? ? ? ?Fall Risk Assessment due??01/01/19??and every 1??year(s)  ??? ? ? ?Functional Assessment due??01/01/19??and every 1??year(s)  ??? ? ? ?Geriatric Depression Screening due??01/01/19??and every 1??year(s)  ??? ??Due?  ??? ? ? ?ADL Screening due??08/08/19??and every 1??year(s)  ??? ? ? ?Bone Density Screening due??08/08/19??Variable frequency  ??? ??Due In Future?  ??? ? ? ?Colorectal Screening (Senior Wellness) not due until??09/29/19??and every 10??year(s)  ??? ? ? ?Alcohol Misuse Screening not due until??01/01/20??and every 1??year(s)  ??? ? ? ?Obesity Screening not due until??01/01/20??and every 1??year(s)  ??? ? ? ?Tetanus Vaccine not due until??01/01/20??and every 10??year(s)  ??? ? ? ?Hypertension Management-BMP not due until??02/08/20??and every 1??year(s)  ??? ? ? ?Aspirin Therapy for CVD Prevention not due until??02/08/20??and every 1??year(s)  ??? ? ? ?Hypertension Management-Education not due until??02/08/20??and every 1??year(s)  ??? ? ? ?Hypertension Management-Blood Pressure not due until??08/07/20??and every 1??year(s)  ???Satisfied?(in the past 1 year)  ??? ??Satisfied?  ??? ? ? ?Alcohol Misuse Screening on??08/08/19.??Satisfied by Jatin Moulton MD  ??? ? ? ?Aspirin Therapy for CVD Prevention on??02/08/19.??Satisfied by Jatin Moulton MD  ??? ? ? ?Blood Pressure Screening on??08/08/19.??Satisfied by Steven RAMEY,  Nora  ??? ? ? ?Body Mass Index Check on??08/08/19.??Satisfied by Nroa Harris MA  ??? ? ? ?Diabetes Screening on??02/08/19.??Satisfied by Lucía Mesa  ??? ? ? ?Hypertension Management-Blood Pressure on??08/08/19.??Satisfied by Nora Harris MA  ??? ? ? ?Influenza Vaccine on??10/01/18.??Satisfied by Nora Harris MA  ??? ? ? ?Lipid Screening on??02/08/19.??Satisfied by Lucía Mesa  ??? ? ? ?Obesity Screening on??08/08/19.??Satisfied by Nora Harris MA  ?

## 2022-05-02 NOTE — HISTORICAL [facility] CERNER
This is a historical note converted from [employer]. Formatting and pictures may have been removed.  Please reference Kumar for original formatting and attached multimedia. Chief Complaint  6 mth htn ov  History of Present Illness  The patient is a 72-year-old white female?here in clinic for evaluation of hypertension.? The patient reports home blood pressures of, not checking.? The patient reports 100% compliance with medication.? The patient reports no side effects with medication use.? The patient reports following a low-sodium diet.? The patient reports some cardiovascular exercise implementation.? There is no chest pain or shortness of breath reported with exercise.? She also reports?100% compliance with her statin medication?and?low-cholesterol diet. ?No side effects with medication use. ?She also reports?stabilization of her situational anxiety with?Xanax use only?during periods of travel especially flight.? No current anxious feelings and no suicidal or homicidal ideations.  Review of Systems  Constitutional_no fever chills,?no unintentional weight loss  Eye_  ENMT_  Respiratory_no shortness of breath or cough  Cardiovascular_no chest pain?or shortness of breath  Gastrointestinal_  Genitourinary_  Hema/Lymph_  Endocrine_  Immunologic_  Musculoskeletal_  Integumentary_  Neurologic/psych_as per HPI  All Other ROS_negative  Physical Exam  Vitals & Measurements  BP:?132/80?  WT:?66.4?kg? WT:?66.4?kg?  VITAL SIGNS:? Reviewed.? ?  GENERAL:? In?no apparent distress.? Alert and Oriented x3  CHEST:? Chest with clear breath sounds bilaterally.??No wheezes, rales, or rhonchi. Good air movement  CARDIAC:??Regular rate and rhythm.? S1 and S2,?without murmurs, gallops, or rubs.  ABDOMINAL: Normal active BS X all 4 quadrants. Nontender. Nondistended.  NEUROLOGIC EXAM:? Alert and oriented x 3.? No focal sensory or strength deficits.? ?Speech normal.? Follows commands.  MUSCULOSKELATAL: Full range of motion.? 5 out of 5  strength throughout.  SKIN: No rash. ?No lesion.  PSYCHIATRIC:? Mood normal.  ?  ?  Assessment/Plan  1.?Hypertension  ?  Essential Hypertension: At goal per JNC 8 guidelines/6 month prescription written/no change to medication regimen  ?   1. ?Advocate 100% compliance?with medication regimen?and?low-sodium diet  2. ?Advocate?increased?cardiovascular exercise as tolerated and educated upon  3.? 10% weight loss goal  4.? Monitor blood pressure?daily?with?an antecubital?digital?cuff  5. ?Follow-up in 6 months for?wellness visit  6. ?Future Lab:?HTN labs today and wellness labs in 6 months  ?  ?  ?  ?  Ordered:  amLODIPine, 2.5 mg = 1 tab(s), Oral, Daily, # 90 tab(s), 1 Refill(s), [facility] [place]  hydrochlorothiazide-lisinopril, 1 tab(s), Oral, Daily, # 90 tab(s), 1 Refill(s), [facility] [place]  Basic Metabolic Panel, Routine collect, 08/06/18 11:09:00 CDT, Blood, Stop date 08/06/18 11:09:00 CDT, Lab Collect, Hypertension, 08/06/18 11:09:00 CDT  Office/Outpatient Visit Level 4 Established [id number] PC, Hypertension  Hyperlipidaemia  Situational anxiety, [facility] - [facility], 08/06/18 10:58:00 CDT  Urinalysis Complete no reflex, Routine collect, Urine, 08/06/18 11:09:00 CDT, Stop date 08/06/18 11:09:00 CDT, Nurse collect, Hypertension  ?  2.?Hyperlipidaemia  ?-continue statin medication as prescribed/no change to medication?regimen/prescription current and prescribed  -FLP at next wellness and hyperlipidemic diet and lifestyle discussed today.  Ordered:  Office/Outpatient Visit Level 4 Established [id number] PC, Hypertension  Hyperlipidaemia  Situational anxiety, [facility] - [facility], 08/06/18 10:58:00 CDT  ?  3.?Situational anxiety  ?-No change to medication regimen and as needed use of Xanax-as needed only  -Relaxation techniques especially describing?travel?and flight  Ordered:  Office/Outpatient Visit Level 4 Established [id number] PC, Hypertension  Hyperlipidaemia  Situational  anxiety, [facility] - [facility], 08/06/18 10:58:00 CDT  ?  Orders:  Clinic Follow up, *Est. 02/08/19 8:00:00 CST, Order for future visit, Wellness examination, [facility]   Problem List/Past Medical History  Ongoing  Cardiac pacemaker  Hyperlipidaemia  Hypertension  MVP - Mitral valve prolapse  Historical  No qualifying data  Medications  ALPRAZOLAM TAB 0.25MG, 0.25 mg= 1 tab(s), Oral, Daily  amLODIPine 2.5 mg oral tablet, 2.5 mg= 1 tab(s), Oral, Daily, 1 refills  hydrochlorothiazide-lisinopril 12.5 mg-20 mg oral tablet, 1 tab(s), Oral, Daily, 1 refills  METOPROL SUC TAB 25MG ER, 25 mg= 1 tab(s), Oral, BID  PRAVASTATIN TAB 20MG, 20 mg= 1 tab(s), Oral, qPM  Allergies  Prevnar 13?(Swelling)  Health Maintenance  Health Maintenance  ???Pending?(in the next year)  ??? ??Due?  ??? ? ? ?Alcohol Misuse Screening due??08/06/18??and every 1??year(s)  ??? ? ? ?Aspirin Therapy for CVD Prevention due??08/06/18??and every 1??year(s)  ??? ? ? ?Bone Density Screening due??08/06/18??Variable frequency  ??? ? ? ?Breast Cancer Screening (Senior Wellness) due??08/06/18??and every?  ??? ? ? ?Colorectal Screening (Senior Wellness) due??08/06/18??and every?  ??? ? ? ?Fall Risk Assessment due??08/06/18??and every 1??year(s)  ??? ? ? ?Functional Assessment due??08/06/18??and every 1??year(s)  ??? ? ? ?Hypertension Management-Education due??08/06/18??and every 1??year(s)  ??? ? ? ?Hypertension Management-Blood Pressure due??08/06/18??and every?  ??? ? ? ?Pneumococcal Vaccine due??08/06/18??and every 100??year(s)  ??? ? ? ?Pneumococcal Vaccine due??08/06/18??and every?  ??? ? ? ?Smoking Cessation due??08/06/18??and every 1??year(s)  ??? ? ? ?Tetanus Vaccine due??08/06/18??and every 10??year(s)  ??? ? ? ?Zoster Vaccine due??08/06/18??and every 100??year(s)  ??? ??Due In Future?  ??? ? ? ?Hypertension Management-BMP not due until??02/28/19??and every 1??year(s)  ???Satisfied?(in the past 1 year)  ??? ??Satisfied?  ??? ? ? ?Blood Pressure Screening  on??08/06/18.??Satisfied by Nora Harris  ??? ? ? ?Diabetes Screening on??08/06/18.??Satisfied by Jatin Moulton MD  ??? ? ? ?Hypertension Management-BMP on??08/06/18.??Satisfied by Jatin Moulton MD  ??? ? ? ?Lipid Screening on??01/30/18.??Satisfied by Oziel Marshall  ??? ? ? ?Obesity Screening on??08/06/18.??Satisfied by Nora Harris  ?  ?

## 2022-05-02 NOTE — HISTORICAL [facility] CERNER
This is a historical note converted from Kumar. Formatting and pictures may have been removed.  Please reference Kumar for original formatting and attached multimedia. Chief Complaint  6 mth ov  History of Present Illness  The patient is a 75 yo white female.? ?Here in clinic for evaluation of hypertension.? The patient reports home blood pressures of 130s over 80s.? The patient reports 100% compliance with medication.? The patient reports no side effects with medication use.? The patient reports following a low-sodium diet.? The patient reports some cardiovascular exercise implementation.? There is no chest pain or shortness of breath reported with exercise.  Patient has intermittent flight anxiety for which she needs a refill today-no side effects with medication use?and good efficacy reported.  She reports 100% compliance with her statin medication with no side effects. ?Following low-cholesterol diet.  Review of Systems  Constitutional_no fever chills,?no unintentional weight loss  Eye_  ENMT_  Respiratory_no shortness of breath or cough  Cardiovascular_no chest pain?or shortness of breath  Gastrointestinal_  Genitourinary_  Hema/Lymph_  Endocrine_  Immunologic_  Musculoskeletal_  Integumentary_  Neurologic_  All Other ROS_negative  Physical Exam  Vitals & Measurements  BP:?132/72?  HT:?167.00?cm? WT:?66.400?kg? BMI:?23.81?  VITAL SIGNS:? Reviewed.? ?  GENERAL:? In?no apparent distress.? Alert and Oriented x3  CHEST:? Chest with clear breath sounds bilaterally.??No wheezes, rales, or rhonchi. Good air movement  CARDIAC:??Regular rate and rhythm.? S1 and S2,?without murmurs, gallops, or rubs.  ABDOMINAL: Normal active BS X all 4 quadrants. Nontender. Nondistended.  NEUROLOGIC EXAM:? Alert and oriented x 3.? No focal sensory or strength deficits.? ?Speech normal.? Follows commands.  MUSCULOSKELATAL: Full range of motion.? 5 out of 5 strength throughout.  SKIN: No rash. ?No lesion.  PSYCHIATRIC:? Mood normal.?  Linear/lucid/normal affect/no internal stimuli response  ?  ?  Assessment/Plan  1.?Hypertension?I10  ?  Essential Hypertension:?At goal per?JNC 8 guidelines?upon recheck by the MD x2.? No change in medication regimen. ?6-month prescription written-see below  ?   1. ?Advocate 100% compliance?with medication regimen?and?low-sodium diet  2. ?Advocate?increased?cardiovascular exercise as tolerated and educated upon  3.? 10% weight loss goal  4.? Monitor blood pressure?daily?with?an antecubital?digital?cuff  5. ?Follow-up in 6 months for?wellness visit  6. ?Future Lab: HTN labs today  ?  ?  ?  ?  Ordered:  amLODIPine, See Instructions, TAKE ONE TABLET ONCE DAILY, # 90 tab(s), 1 Refill(s), Pharmacy: [facility], 167, cm, Height/Length Dosing, 09/28/20 10:35:00 CDT, 66.4, kg, Weight Dosing, 09/28/20 10:35:00 CDT  hydrochlorothiazide-lisinopril, See Instructions, TAKE ONE TABLET ONCE DAILY, # 90 tab(s), 1 Refill(s), Pharmacy: [facility], 167, cm, Height/Length Dosing, 09/28/20 10:35:00 CDT, 66.4, kg, Weight Dosing, 09/28/20 10:35:00 CDT  Office/Outpatient Visit Level 4 Established [id number] PC, Hypertension  Hyperlipidaemia  Situational anxiety, [facility] - [facility], 09/28/20 10:47:00 CDT  ?  2.?Hyperlipidaemia?E78.5  ??-Statin medication is?efficacious with no side effects  -Continue medication dosing with no change-12-month prescription given  -Low-cholesterol diet?given and educated upon  -FLP?drawn-to be drawn at next wellness visit  Ordered:  pravastatin, 20 mg = 1 tab(s), Oral, Once a day (at bedtime), # 90 tab(s), 0 Refill(s), Pharmacy: [facility], 167, cm, Height/Length Dosing, 09/28/20 10:35:00 CDT, 66.4, kg, Weight Dosing, 09/28/20 10:35:00 CDT  Office/Outpatient Visit Level 4 Established [id number] PC, Hypertension  Hyperlipidaemia  Situational anxiety, [facility] - [facility], 09/28/20 10:47:00 CDT  ?  3.?Situational anxiety?F41.8  ?-PRN use of alprazolam for flight anxiety-good efficacy and no side effects  -We  discussed at length dependency side effects?and use only as needed-she voiced understanding  Ordered:  Office/Outpatient Visit Level 4 Established [id number] PC, Hypertension  Hyperlipidaemia  Situational anxiety, [facility] - [facility], 09/28/20 10:47:00 CDT  ?  Orders:  Clinic Follow up, *Est. 03/30/21 9:00:00 CDT, Order for future visit, Wellness examination, [facility]  -Shingrix was resent to the patients pharmacy for completion  -She will receive her flu vaccine with the pharmacy at [facility]  Clinic Follow up, *Est. 03/30/21 9:00:00 CDT, Order for future visit, Wellness examination, [facility]   Problem List/Past Medical History  Ongoing  Cardiac pacemaker  Hyperlipidaemia  Hypertension  MVP - Mitral valve prolapse  Situational anxiety  Wellness examination  Historical  No qualifying data  Procedure/Surgical History  Colonoscopy (2009)  Cardiac pacemaker  Tonsillectomy and adenoidectomy   Medications  alPRAzolam 0.25 mg oral tab, 0.25 mg= 1 tab(s), Oral, Daily, PRN  amLODIPine 2.5 mg oral tablet, See Instructions, 1 refills  hydrochlorothiazide-lisinopril 12.5 mg-20 mg oral tablet, See Instructions, 1 refills  METOPROL SUC TAB 25MG ER, 25 mg= 1 tab(s), Oral, BID  pravastatin 20 mg oral tablet, 20 mg= 1 tab(s), Oral, Once a day (at bedtime)  Allergies  Prevnar 13?(Swelling)  Social History  Abuse/Neglect  No, 08/08/2019  Tobacco  Never (less than 100 in lifetime), N/A, 08/08/2019  Never (less than 100 in lifetime), N/A, 02/08/2019  Family History  Heart disease: Father.  Immunizations  Vaccine Date Status   tetanus-diphtheria toxoids 03/26/2020 Given   influenza virus vaccine, inactivated 10/01/2018 Recorded   influenza virus vaccine, inactivated 09/29/2017 Recorded   influenza virus vaccine, inactivated 10/14/2016 Recorded   pneumococcal 13-valent conjugate vaccine 2016 Recorded   pneumococcal 23-polyvalent vaccine 2016 Recorded   zoster vaccine live 2015 Recorded   tetanus/diphtheria/pertussis, acel(Tdap) 2010  Recorded   Health Maintenance  Health Maintenance  ???Pending?(in the next year)  ??? ??OverDue  ??? ? ? ?Pneumococcal Vaccine due??and every?  ??? ? ? ?Advance Directive due??01/02/20??and every 1??year(s)  ??? ? ? ?Cognitive Screening due??01/02/20??and every 1??year(s)  ??? ? ? ?Fall Risk Assessment due??01/02/20??and every 1??year(s)  ??? ? ? ?Functional Assessment due??01/02/20??and every 1??year(s)  ??? ??Due?  ??? ? ? ?ADL Screening due??09/28/20??and every 1??year(s)  ??? ? ? ?Depression Screening due??09/28/20??and every?  ??? ? ? ?Influenza Vaccine due??09/28/20??and every?  ??? ? ? ?Zoster Vaccine due??09/28/20??and every?  ??? ??Due In Future?  ??? ? ? ?Obesity Screening not due until??01/01/21??and every 1??year(s)  ??? ? ? ?Medicare Annual Wellness Exam not due until??03/26/21??and every 1??year(s)  ??? ? ? ?Aspirin Therapy for CVD Prevention not due until??03/26/21??and every 1??year(s)  ??? ? ? ?Hypertension Management-Education not due until??03/26/21??and every 1??year(s)  ???Satisfied?(in the past 1 year)  ??? ??Satisfied?  ??? ? ? ?Aspirin Therapy for CVD Prevention on??03/26/20.??Satisfied by Jatin Moulton MD  ??? ? ? ?Blood Pressure Screening on??09/28/20.??Satisfied by Jatin Moulton MD  ??? ? ? ?Body Mass Index Check on??09/28/20.??Satisfied by Nora Harris MA  ??? ? ? ?Bone Density Screening on??04/14/20.??Satisfied by Jackie Orona  ??? ? ? ?Breast Cancer Screening on??03/18/20.??Satisfied by Jackie Orona  ??? ? ? ?Depression Screening on??09/28/20.??Satisfied by Jatin Moulton MD  ??? ? ? ?Diabetes Screening on??09/28/20.??Satisfied by Dale Fan  ??? ? ? ?Hypertension Management-Blood Pressure on??09/28/20.??Satisfied by Jatin Moulton MD  ??? ? ? ?Lipid Screening on??03/26/20.??Satisfied by Lucía Mesa  ??? ? ? ?Medicare Annual Wellness Exam on??03/26/20.??Satisfied by Jatin Moulton MD  ??? ? ? ?Obesity Screening on??09/28/20.??Satisfied by Nora Harris MA  ??? ?  ? ?Tetanus Vaccine on??03/26/20.??Satisfied by Nora Harris MA  ??? ??Refused?  ??? ? ? ?Influenza Vaccine on??09/28/20.??Recorded by Kenney ROSEN, Jatin DEE  ?

## 2022-05-02 NOTE — HISTORICAL [facility] C[facility]ER
This is a historical note converted from [employer]. Formatting and pictures may have been removed.  Please reference [employer] for original formatting and attached multimedia. Chief Complaint  wellnes  History of Present Illness  The patient is a 75 ?year old white female here today for a complete Wellness physical.? The patient?has?no acute complaints today. The patient also carries a diagnosis of?HTN/HLD-followed?by our clinic, which is assessed today?as stable with no new issues.??She is followed for her?sick sinus syndrome/pacemaker by cardiology Dr. Palomares. ?Exercise is reported as moderate and includes?walking primarily with no chest pain or shortness of breath.?? Diet modifications are reported as?attempting lean proteins and low sodium.?? Previous documented weight is recorded? as charted.  She is also?here in clinic for evaluation of hypertension.? The patient reports home blood pressures of 130s over 80s?at home.? The patient reports 100% compliance with medication.? The patient reports no side effects with medication use.? The patient reports following a low-sodium diet.? The patient reports some cardiovascular exercise implementation.? There is no chest pain or shortness of breath reported with exercise.  She reports 100% compliance with her statin medication with no side effects?and following a low-cholesterol diet. ?No change to regimen desired.  ?   PMHX, PSurgHX, PSocHX?reviewed at length by provider.??Updates-patient reports?having a fairly normal DEXA scan a few weeks ago but her?GYN would like her to consider Prolia-we will get record to review.  ?   Other Current?providers:  Dr. Palomares-cardiologist closely for history of?sick sinus syndrome/cardiac pacemaker/MVP. ?Asymptomatic today.   and Dr. Friedman-GI  Dr.?Fusilier-GYN  Review of Systems  Constitutional:?no weight gain,?no weight loss,?no fatigue,?no fever,?no chills,?no weakness,?no trouble sleeping.  Eyes:?no vision loss/changes,?no  glasses or contacts,?no pain,?no redness,?no blurry or double vision,?no flashing lights,?no specks,?no glaucoma,?no cataracts.  Last eye exam:?within last 6 months  Head:?no headache,?no head injury,?no neck pain.?  Neck:??no lumps,?no swollen glands,?no stiffness.  Ears:?no decreased hearing,?no ringing,?no earache,?no drainage.?  Nose:?no stuffiness,?no discharge,?no itching,?no hay fever,?no nosebleeds,?no sinus pain.  Throat:?no bleeding,?no dentures,?no sore tongue,?no dry mouth,?no sore throat,?no hoarseness,?no thrush,?no non-healing sores.  Cardiovascular:?no chest pain or discomfort,?no tightness,?no palpitations,?no SOB with activity,?no difficulty breathing while supine,?no swelling,?no sudden awakening from sleep with SOB.  Vascular:?no calf pain with walking,?no leg cramping.  Respiratory:??no cough,?no sputum,?no coughing up blood,?no SOB,?no wheezing,?no painful breathing.  Gastrointestinal:?no swallowing difficulty,?no heartburn,?no change in appetite,?no nausea,?no change in bowel habits,?no rectal bleeding,?no constipation,?no diarrhea,?no yellow eyes or skin.  Urinary:?no frequency,?no urgency,?no burning or pain,?no blood in urine,?no incontinence,?no change in urinary strength.  Musculoskeletal:?no muscle or joint pain,?no stiffness,?no back pain,?no redness of joints,?no swelling of joints,?no trauma.  Skin:?no rashes,?no lumps,?no itching,?no dryness,?color normal for ethnicity,?no hair or nail changes.  Neurologic:?no dizziness,?no fainting,?no seizures,?no weakness,?no numbness,?no tingling,?no tremors.  Psychiatric:?no nervousness,?no stress,?no depression,?no memory loss.  Endocrine:?no heat or cold intolerance,?no sweating,?no frequent urination,?no thirst,?no change in appetite.  Hematologic:?no ease of bruising,?no ease of bleeding.  ?  ?  ?  ?  Physical Exam  Vitals & Measurements  BP:?133/80?  HT:?167.00?cm? HT:?167?cm? WT:?67.200?kg? WT:?67.2?kg? BMI:?24.1?  VITAL SIGNS:? Reviewed.?  ?  GENERAL:? In?no apparent distress.? Alert and Oriented x3  HEAD:?No signsof head trauma. Normocephalic  EYES:? Pupils?equal/round/reactive.? Extraocular motionsintact.  EARS:? Hearing?grossly intact. TMs and EAC?clear  MOUTH:??Oropharynx is clear.?No erythema. No exudates  NECK:? No LAD. No JVD. No thyromegaly. No bruits  CHEST:? Chest with clear breath sounds bilaterally.? No wheezes, rales, or rhonchi. Good air movement  CARDIAC:? Regular rate and rhythm.? S1 and S2, without murmurs, gallops, or rubs.  VASCULAR:??No Edema.? Peripheral pulses normal and equal in all extremities.  ABDOMEN:?Soft, without detectable tenderness.??No sign of distention.?No rebound or guarding, and no masses palpated.? ?Bowel Sounds present and normal x 4.  MUSCULOSKELETAL:??Good range of motion of all major joints.?5/5 strength throughout. Extremities without clubbing, cyanosis or edema.  NEUROLOGIC EXAM:? Alert and oriented x 3.? No focal sensory or strength deficits.? ?Speech normal.? Follows commands.  PSYCHIATRIC:? Mood normal.  SKIN:??No rash or lesions.  Assessment/Plan  1.?Wellness examination?Z00.00  ?Assessment/Plan:  ?   1.?Wellness  ?   -Health and Exercise Prescription issued and educated upon  ?   -10% weight loss goal  ?   -Lifestyle counseling >20minutes  ?   -Diet: Low-sodium/cardiac  ?   -Screening: GYN screening-breast/Pap/mammogram up-to-date with Dr. Rollins-we will request records.? She is actually done with Pap per age?and guidelines but she will continue to acquire her Pap with her GYN as long as he recommends it. ?Colonoscopy done per age.? We will inquire with the patient regarding DEXA scan-we will acquire records for review-to see if Prolia or oral?medication is validated  ?   -Vaccines:?Shingrix No. 2 vaccine was performed-we will update system; otherwise UTD  ?   -Labs:?See below  ?   2.Comorbidities:?See below  ?  3. Referrals:?None  ?  4. RTC: 12-month wellness and 6-month HTN  ?  Ordered:  Medicare  Annual Wellness- Subsequent  , Wellness examination, [facility], 03/30/21 9:34:00 CDT  ?  2.?Hypertension?I10  ?  Essential Hypertension:?At goal per?JNC 8 guidelines.? No change in medication regimen. ?6-month prescription written-see below  ?   1. ?Advocate 100% compliance?with medication regimen?and?low-sodium diet  2. ?Advocate?increased?cardiovascular exercise as tolerated and educated upon  3.? 10% weight loss goal  4.? Monitor blood pressure?daily?with?an antecubital?digital?cuff  5. ?Follow-up in 6 months for?HTN visit  6. ?Future Lab: HTN labs in 6 months  ?  ?  ?  ?  Ordered:  amLODIPine, See Instructions, TAKE ONE TABLET ONCE DAILY, # 90 tab(s), 1 Refill(s), Pharmacy: [facility], 167, cm, Height/Length Dosing, 03/30/21 9:08:00 CDT, 67.2, kg, Weight Dosing, 03/30/21 9:08:00 CDT  hydrochlorothiazide-lisinopril, See Instructions, TAKE ONE TABLET ONCE DAILY, # 90 tab(s), 1 Refill(s), Pharmacy: [facility], 167, cm, Height/Length Dosing, 03/30/21 9:08:00 CDT, 67.2, kg, Weight Dosing, 03/30/21 9:08:00 CDT  Clinic Follow up, *Est. 09/30/21 9:00:00 CDT, Order for future visit, Hypertension, [facility]  ?  3.?Hyperlipidaemia?E78.5  ??-Statin medication is?efficacious with no side effects  -Continue medication dosing with no change-12-month prescription given  -Low-cholesterol diet?given and educated upon  -FLP?ixdid-udhkti-co  Ordered:  pravastatin, See Instructions, TAKE ONE TABLET AT BEDTIME, # 90 tab(s), 3 Refill(s), Pharmacy: [facility], 167, cm, Height/Length Dosing, 03/30/21 9:08:00 CDT, 67.2, kg, Weight Dosing, 03/30/21 9:08:00 CDT  ?  4.?MVP - Mitral valve prolapse?I34.1  ?-Stable currently and followed directly by cardiology  ?  5.?Situational anxiety?F41.8  ?-Stable currently and only as needed use for flight anxiety  -Alprazolam prescribed today to take as directed/side effects discussed at length and not to drive or operate heavy machinery while on this  medication  ?  Referrals  Clinic Follow up, *Est. 09/30/21 9:00:00 CDT, Order for future visit, Hypertension, [facility]   Problem List/Past Medical History  Ongoing  Cardiac pacemaker  Hyperlipidaemia  Hypertension  MVP - Mitral valve prolapse  Situational anxiety  Wellness examination  Historical  No qualifying data  Procedure/Surgical History  Colonoscopy (2009)  Cardiac pacemaker  Tonsillectomy and adenoidectomy   Medications  amLODIPine 2.5 mg oral tablet, See Instructions, 1 refills  doxycycline 20 mg oral tablet, 20 mg= 1 tab(s), Oral, BID  hydrochlorothiazide-lisinopril 12.5 mg-20 mg oral tablet, See Instructions, 1 refills  METOPROL SUC TAB 25MG ER, 25 mg= 1 tab(s), Oral, BID  pravastatin 20 mg oral tablet, See Instructions, 3 refills  Allergies  Prevnar 13?(Swelling)  Social History  Abuse/Neglect  No, 03/30/2021  No, 08/08/2019  Tobacco  Never (less than 100 in lifetime), No, 03/30/2021  Never (less than 100 in lifetime), N/A, 08/08/2019  Never (less than 100 in lifetime), N/A, 02/08/2019  Family History  Heart disease: Father.  Immunizations  Vaccine Date Status   COVID-19 MRNA, LNP-S, PF- Pfizer 02/03/2021 Given   COVID-19 MRNA, LNP-S, PF- Pfizer 01/13/2021 Given   zoster vaccine, inactivated 10/24/2020 Recorded   influenza virus vaccine, inactivated 10/24/2020 Recorded   tetanus-diphtheria toxoids 03/26/2020 Given   influenza virus vaccine, inactivated 11/08/2019 Recorded   influenza virus vaccine, inactivated 10/01/2018 Recorded   influenza virus vaccine, inactivated 09/29/2017 Recorded   influenza virus vaccine, inactivated 10/14/2016 Recorded   pneumococcal 13-valent conjugate vaccine 2016 Recorded   pneumococcal 23-polyvalent vaccine 2016 Recorded   zoster vaccine live 2015 Recorded   tetanus/diphtheria/pertussis, acel(Tdap) 2010 Recorded   Health Maintenance  Health Maintenance  ???Pending?(in the next year)  ??? ??OverDue  ??? ? ? ?Advance Directive due??01/02/21??and every 1??year(s)  ??? ? ?  ?Cognitive Screening due??01/02/21??and every 1??year(s)  ??? ? ? ?Fall Risk Assessment due??01/02/21??and every 1??year(s)  ??? ? ? ?Functional Assessment due??01/02/21??and every 1??year(s)  ??? ??Due?  ??? ? ? ?Aspirin Therapy for CVD Prevention due??03/26/21??and every 1??year(s)  ??? ? ? ?ADL Screening due??03/30/21??and every 1??year(s)  ??? ? ? ?Zoster Vaccine due??03/30/21??Unknown Frequency  ??? ??Due In Future?  ??? ? ? ?Depression Screening not due until??09/28/21??and every 1??year(s)  ??? ? ? ?Hypertension Management-BMP not due until??09/28/21??and every 1??year(s)  ??? ? ? ?Influenza Vaccine not due until??10/01/21??and every 1??day(s)  ??? ? ? ?Obesity Screening not due until??01/01/22??and every 1??year(s)  ???Satisfied?(in the past 1 year)  ??? ??Satisfied?  ??? ? ? ?Blood Pressure Screening on??03/30/21.??Satisfied by Landry Orona  ??? ? ? ?Body Mass Index Check on??03/30/21.??Satisfied by Landry Orona  ??? ? ? ?Bone Density Screening on??04/14/20.??Satisfied by Jackie Orona  ??? ? ? ?Depression Screening on??09/28/20.??Satisfied by Jatin Moulton MD  ??? ? ? ?Diabetes Screening on??03/30/21.??Satisfied by Dale Fan  ??? ? ? ?Hypertension Management-Education on??03/30/21.??Satisfied by Jatin Moulton MD  ??? ? ? ?Hypertension Management-BMP on??03/30/21.??Satisfied by Dale Fan  ??? ? ? ?Hypertension Management-Blood Pressure on??03/30/21.??Satisfied by Landry Orona  ??? ? ? ?Influenza Vaccine on??10/24/20.??Satisfied by Nora Harris MA  ??? ? ? ?Lipid Screening on??03/30/21.??Satisfied by Dale Fan  ??? ? ? ?Medicare Annual Wellness Exam on??03/30/21.??Satisfied by Jatin Moulton MD  ??? ? ? ?Obesity Screening on??03/30/21.??Satisfied by Landry Orona  ??? ? ? ?Zoster Vaccine on??10/24/20.??Satisfied by Nora Harris MA  ??? ??Refused?  ??? ? ? ?Influenza Vaccine on??09/28/20.??Recorded by Jatin Moulton MD  ?

## 2022-07-19 ENCOUNTER — HOSPITAL ENCOUNTER (OUTPATIENT)
Facility: HOSPITAL | Age: 77
Discharge: HOME | End: 2022-07-19
Attending: INTERNAL MEDICINE | Admitting: INTERNAL MEDICINE
Payer: MEDICARE

## 2022-07-19 VITALS
SYSTOLIC BLOOD PRESSURE: 132 MMHG | BODY MASS INDEX: 23.77 KG/M2 | RESPIRATION RATE: 18 BRPM | OXYGEN SATURATION: 100 % | HEART RATE: 61 BPM | HEIGHT: 66 IN | WEIGHT: 147.94 LBS | TEMPERATURE: 99 F | DIASTOLIC BLOOD PRESSURE: 86 MMHG

## 2022-07-19 DIAGNOSIS — Z45.010 PACEMAKER AT END OF BATTERY LIFE: ICD-10-CM

## 2022-07-19 DIAGNOSIS — I49.9 ARRHYTHMIA: ICD-10-CM

## 2022-07-19 PROCEDURE — 99152 MOD SED SAME PHYS/QHP 5/>YRS: CPT | Performed by: INTERNAL MEDICINE

## 2022-07-19 PROCEDURE — 93005 ELECTROCARDIOGRAM TRACING: CPT | Mod: 59

## 2022-07-19 PROCEDURE — 27201423 OPTIME MED/SURG SUP & DEVICES STERILE SUPPLY: Performed by: INTERNAL MEDICINE

## 2022-07-19 PROCEDURE — 93010 EKG 12-LEAD: ICD-10-PCS | Mod: ,,, | Performed by: INTERNAL MEDICINE

## 2022-07-19 PROCEDURE — 93010 ELECTROCARDIOGRAM REPORT: CPT | Mod: ,,, | Performed by: INTERNAL MEDICINE

## 2022-07-19 PROCEDURE — 33228 REMV&REPLC PM GEN DUAL LEAD: CPT | Mod: SC | Performed by: INTERNAL MEDICINE

## 2022-07-19 PROCEDURE — C1785 PMKR, DUAL, RATE-RESP: HCPCS | Performed by: INTERNAL MEDICINE

## 2022-07-19 PROCEDURE — 63600175 PHARM REV CODE 636 W HCPCS: Performed by: INTERNAL MEDICINE

## 2022-07-19 PROCEDURE — 25000003 PHARM REV CODE 250: Performed by: INTERNAL MEDICINE

## 2022-07-19 DEVICE — IPG W1DR01 AZURE XT DR MRI USA
Type: IMPLANTABLE DEVICE | Site: CHEST | Status: FUNCTIONAL
Brand: AZURE™ XT DR MRI SURESCAN™

## 2022-07-19 RX ORDER — AMLODIPINE BESYLATE 2.5 MG/1
2.5 TABLET ORAL DAILY
COMMUNITY
Start: 2022-05-11 | End: 2022-10-13 | Stop reason: SDUPTHER

## 2022-07-19 RX ORDER — LIDOCAINE HYDROCHLORIDE 20 MG/ML
INJECTION, SOLUTION INFILTRATION; PERINEURAL
Status: DISCONTINUED | OUTPATIENT
Start: 2022-07-19 | End: 2022-07-19 | Stop reason: HOSPADM

## 2022-07-19 RX ORDER — DIPHENHYDRAMINE HYDROCHLORIDE 50 MG/ML
INJECTION INTRAMUSCULAR; INTRAVENOUS
Status: DISCONTINUED | OUTPATIENT
Start: 2022-07-19 | End: 2022-07-19 | Stop reason: HOSPADM

## 2022-07-19 RX ORDER — FENTANYL CITRATE 50 UG/ML
INJECTION, SOLUTION INTRAMUSCULAR; INTRAVENOUS
Status: DISCONTINUED | OUTPATIENT
Start: 2022-07-19 | End: 2022-07-19 | Stop reason: HOSPADM

## 2022-07-19 RX ORDER — VANCOMYCIN HYDROCHLORIDE 1 G/20ML
INJECTION, POWDER, LYOPHILIZED, FOR SOLUTION INTRAVENOUS
Status: DISCONTINUED | OUTPATIENT
Start: 2022-07-19 | End: 2022-07-19 | Stop reason: HOSPADM

## 2022-07-19 RX ORDER — NITROFURANTOIN 25; 75 MG/1; MG/1
100 CAPSULE ORAL 2 TIMES DAILY
COMMUNITY
End: 2022-09-20

## 2022-07-19 RX ORDER — MIDAZOLAM HYDROCHLORIDE 1 MG/ML
INJECTION, SOLUTION INTRAMUSCULAR; INTRAVENOUS
Status: DISCONTINUED | OUTPATIENT
Start: 2022-07-19 | End: 2022-07-19 | Stop reason: HOSPADM

## 2022-07-19 RX ORDER — VANCOMYCIN HCL IN 5 % DEXTROSE 1G/250ML
1000 PLASTIC BAG, INJECTION (ML) INTRAVENOUS
Status: DISCONTINUED | OUTPATIENT
Start: 2022-07-19 | End: 2022-09-20

## 2022-07-19 RX ORDER — PRAVASTATIN SODIUM 20 MG/1
20 TABLET ORAL NIGHTLY
COMMUNITY
Start: 2022-06-17 | End: 2022-10-13 | Stop reason: SDUPTHER

## 2022-07-19 RX ORDER — LISINOPRIL AND HYDROCHLOROTHIAZIDE 12.5; 2 MG/1; MG/1
TABLET ORAL
COMMUNITY
Start: 2021-09-20 | End: 2022-08-22

## 2022-07-19 RX ORDER — HYDROCODONE BITARTRATE AND ACETAMINOPHEN 5; 325 MG/1; MG/1
1 TABLET ORAL EVERY 4 HOURS PRN
Status: DISCONTINUED | OUTPATIENT
Start: 2022-07-19 | End: 2022-07-19 | Stop reason: HOSPADM

## 2022-07-19 RX ORDER — ACETAMINOPHEN 325 MG/1
650 TABLET ORAL EVERY 4 HOURS PRN
Status: DISCONTINUED | OUTPATIENT
Start: 2022-07-19 | End: 2022-07-19 | Stop reason: HOSPADM

## 2022-07-19 RX ORDER — METOPROLOL SUCCINATE 50 MG/1
50 TABLET, EXTENDED RELEASE ORAL 2 TIMES DAILY
COMMUNITY
Start: 2022-07-07 | End: 2022-09-20 | Stop reason: SDUPTHER

## 2022-07-19 NOTE — Clinical Note
The chest and neck was prepped. The site was prepped with ChloraPrep. The site was clipped. The patient was draped. The patient was positioned supine.

## 2022-08-15 DIAGNOSIS — E04.2 MULTINODULAR THYROID: Primary | ICD-10-CM

## 2022-08-16 ENCOUNTER — OFFICE VISIT (OUTPATIENT)
Dept: OTOLARYNGOLOGY | Facility: CLINIC | Age: 77
End: 2022-08-16
Payer: MEDICARE

## 2022-08-16 VITALS
DIASTOLIC BLOOD PRESSURE: 89 MMHG | SYSTOLIC BLOOD PRESSURE: 152 MMHG | WEIGHT: 142.13 LBS | TEMPERATURE: 98 F | HEIGHT: 66 IN | HEART RATE: 89 BPM | BODY MASS INDEX: 22.84 KG/M2

## 2022-08-16 DIAGNOSIS — E04.2 MULTINODULAR THYROID: ICD-10-CM

## 2022-08-16 PROCEDURE — 99999 PR PBB SHADOW E&M-EST. PATIENT-LVL III: ICD-10-PCS | Mod: PBBFAC,,, | Performed by: OTOLARYNGOLOGY

## 2022-08-16 PROCEDURE — 99213 OFFICE O/P EST LOW 20 MIN: CPT | Mod: PBBFAC,25 | Performed by: OTOLARYNGOLOGY

## 2022-08-16 PROCEDURE — 99204 PR OFFICE/OUTPT VISIT, NEW, LEVL IV, 45-59 MIN: ICD-10-PCS | Mod: 25,S$PBB,, | Performed by: OTOLARYNGOLOGY

## 2022-08-16 PROCEDURE — 99999 PR PBB SHADOW E&M-EST. PATIENT-LVL III: CPT | Mod: PBBFAC,,, | Performed by: OTOLARYNGOLOGY

## 2022-08-16 PROCEDURE — 99204 OFFICE O/P NEW MOD 45 MIN: CPT | Mod: 25,S$PBB,, | Performed by: OTOLARYNGOLOGY

## 2022-08-16 PROCEDURE — 10005 PR FINE NEEDLE ASP BIOPSY, W/US GUIDANCE, 1ST LESION: ICD-10-PCS | Mod: S$PBB,,, | Performed by: OTOLARYNGOLOGY

## 2022-08-16 PROCEDURE — 10005 FNA BX W/US GDN 1ST LES: CPT | Mod: S$PBB,,, | Performed by: OTOLARYNGOLOGY

## 2022-08-16 PROCEDURE — 10005 FNA BX W/US GDN 1ST LES: CPT | Mod: PBBFAC | Performed by: OTOLARYNGOLOGY

## 2022-08-16 NOTE — PROGRESS NOTES
Chief Complaint   Patient presents with    New patient.     Multinodular thyroid.         76 y.o. female presents for evaluation of a multinodular thyroid, with a 3.1 cm dominant right-sided thyroid nodule, and a 1.6 cm dominant left-sided thyroid nodule, both of which met criteria for consideration of FNA on a recent ultrasound done at [facility].  She was referred to me in that regard for biopsy.    She has no family history of thyroid malignancy.  No external beam radiation.  No lymphadenopathy, or any other concerning factors.        Past Medical History:   Diagnosis Date    Coronary artery disease     Hyperlipidemia     Hypertension     Sinus node dysfunction        Past Surgical History:   Procedure Laterality Date    COLON SURGERY      INSERTION OF PACEMAKER      REPLACEMENT OF DUAL CHAMBER PACEMAKER GENERATOR N/A 7/19/2022    Procedure: REPLACEMENT, PULSE GENERATOR, CARDIAC PACEMAKER, DUAL CHAMBER;  Surgeon: Ron Tran MD;  Location: [facility] CATH LAB;  Service: Cardiology;  Laterality: N/A;  PPM GEN CHANGE (MDT)       family history is not on file.    Pt  reports that she quit smoking about 47 years ago. Her smoking use included cigarettes. She has never used smokeless tobacco. She reports previous alcohol use. She reports that she does not use drugs.    Review of patient's allergies indicates:  No Known Allergies     Physical Exam    Vitals:    08/16/22 1139   BP: (!) 152/89   Pulse: 89   Temp: 98 °F (36.7 °C)     Body mass index is 22.94 kg/m².    General: AOx3, NAD  Right Ear: External Auditory Canal WNL,TM w/o masses/lesions/perforations  Left Ear:  External Auditory Canal WNL,TM w/o masses/lesions/perforations  Nose: No gross nasal septal deviation.  Inferior Turbinates WNL bilaterally.  No septal perforation.  No masses/lesions.  Oral Cavity: FOM Soft, no masses palpated.  Oral Tongue mobile.  Hard Palate WNL.  Oropharynx: BOT WNL.  No masses/lesions noted.  Tonsillar fossa without  lesions.  Soft palate without masses.  Midline uvula.  Neck: No palpable lymphadenopathy at I - VI.    Face: House Brackmann I bilaterally.  Eyes: Normal extra ocular motion bilaterally.    Fine needle Aspiration: After informed consent was obtained, her anterior neck was anesthetized.  Adequate time was allowed for the epinephrine to take effect and vasoconstrictive the surrounding vessels.  A needle was then used under ultrasound guidance to biopsy the 3.1 cm right-sided dominant nodule without issue.  After this, a fresh needle was then used to biopsy the left 1.6 cm dominant nodule without issue.  She tolerated it well.      Assessment     1. Multinodular thyroid          Plan  In summary, both nodules met criteria for biopsy.  I do not have an overly high suspicion for malignancy at this point, but we checked just to make sure.  Issues I get the results, we will be in touch with her, to make a plan going forward.

## 2022-09-19 ENCOUNTER — HISTORICAL (OUTPATIENT)
Dept: ADMINISTRATIVE | Facility: HOSPITAL | Age: 77
End: 2022-09-19
Payer: MEDICARE

## 2022-09-20 ENCOUNTER — TELEPHONE (OUTPATIENT)
Dept: INTERNAL MEDICINE | Facility: CLINIC | Age: 77
End: 2022-09-20

## 2022-09-20 ENCOUNTER — OFFICE VISIT (OUTPATIENT)
Dept: INTERNAL MEDICINE | Facility: CLINIC | Age: 77
End: 2022-09-20
Payer: MEDICARE

## 2022-09-20 VITALS
DIASTOLIC BLOOD PRESSURE: 78 MMHG | RESPIRATION RATE: 18 BRPM | OXYGEN SATURATION: 97 % | BODY MASS INDEX: 22.95 KG/M2 | WEIGHT: 142.81 LBS | SYSTOLIC BLOOD PRESSURE: 132 MMHG | HEART RATE: 79 BPM | HEIGHT: 66 IN

## 2022-09-20 DIAGNOSIS — Z95.0 CARDIAC PACEMAKER IN SITU: ICD-10-CM

## 2022-09-20 DIAGNOSIS — J34.89 RHINORRHEA: ICD-10-CM

## 2022-09-20 DIAGNOSIS — E78.5 HYPERLIPIDEMIA, UNSPECIFIED HYPERLIPIDEMIA TYPE: ICD-10-CM

## 2022-09-20 DIAGNOSIS — I10 PRIMARY HYPERTENSION: ICD-10-CM

## 2022-09-20 DIAGNOSIS — R63.4 WEIGHT LOSS: ICD-10-CM

## 2022-09-20 DIAGNOSIS — R53.83 FATIGUE, UNSPECIFIED TYPE: Primary | ICD-10-CM

## 2022-09-20 PROBLEM — I34.1 MITRAL VALVE PROLAPSE: Status: ACTIVE | Noted: 2022-09-20

## 2022-09-20 PROCEDURE — 99205 OFFICE O/P NEW HI 60 MIN: CPT | Mod: ,,, | Performed by: INTERNAL MEDICINE

## 2022-09-20 PROCEDURE — 99205 PR OFFICE/OUTPT VISIT, NEW, LEVL V, 60-74 MIN: ICD-10-PCS | Mod: ,,, | Performed by: INTERNAL MEDICINE

## 2022-09-20 RX ORDER — METOPROLOL SUCCINATE 50 MG/1
50 TABLET, EXTENDED RELEASE ORAL DAILY
Qty: 30 TABLET | Refills: 11
Start: 2022-09-20

## 2022-09-20 RX ORDER — FLECAINIDE ACETATE 50 MG/1
50 TABLET ORAL 2 TIMES DAILY
COMMUNITY
Start: 2022-09-07

## 2022-09-20 NOTE — PROGRESS NOTES
Chief complaint:  Generalized fatigue, unexplained weight loss, recent chronic rhinorrhea.      The patient is a 76-year-old woman who I saw many years ago when she was hospitalized for a few days following food poisoning.  I had not seen her professionally since in.  She asked to be seen again to evaluate these problems and I agreed to see her.      She was doing well until a few months ago when she started having a series of problems.  About 2 months ago a pacemaker which is been placed many years ago he did a battery replacement.  That was done uneventfully.  Since that time she had had some issues with palpitations and she is being worked up by her cardiologist now.  It sounds like she has an event monitor in place and has further testing to be done in the near future including a stress test.  She also has had issues with generalized fatigue and a weight loss of about 5 lb which is very unusual for her.  Her weight has been very stable and she has not tried to lose weight.      He also had a chronic clear rhinorrhea for about 2 months which seems to have finally cleared.    Current medications are listed elsewhere in this record.  These were reviewed.  It sounds like the flecainide was started just in the last few days but the other meds or chronic     Allergies to pneumonia vaccine    Past medical history notable for hypertension and hyperlipidemia.  Apparently she had a third-degree heart block requiring the pacemaker years ago.  She also recently had thyroid nodules found that resulted in a benign biopsy by Dr. Sigala.  She is also status post partial colectomy many years ago following a colon perforation from a screening colonoscopy.  She also had some sort of growth on the kidney removed, by history of sounds like it was a cyst, this surgery done by Dr. Marcos martinez.    She does not smoke and never did.  She does not use alcohol.    She lives at home with her  she remains very active.    Family history  noncontributory     Her weight has been stable.  No fevers chills or night sweats.  Her appetite has been good.  No unusual headaches.  No earache or sore throat.  No history of diabetes.  No chest pain orthopnea PND.  She did have some mild pedal edema in the last few weeks but that resolved.  No cough although she has had some mild chest congestion recently.  Especially when she had the rhinorrhea.  May be some wheezing but no history of asthma.  No nausea or vomiting or change in bowel habits.  No blood in the stools.  No urgency frequency dysuria.    Vital signs are stable including a blood pressure of 132/78 heart rate of 70 diet.  General appearance is unremarkable.  HEENT exam reveals clear sclera pupils equal round react light.  Throat is clear.  Neck is supple with perhaps slight fullness of the thyroid.  Carotid pulses 2+ bilaterally without bruits.  Heart has a regular rate and rhythm.  Lungs clear.  Abdomen nontender.  Extremities without clubbing cyanosis or edema.  Foot pulses intact.  I did not palpate any abnormal cervical clavicular or axillary nodes.      Impression 1. Generalized fatigue.  Etiology unclear.  Certainly nothing obvious on exam.  I would think the most likely thing is a degree of depression related to  multiple problems that have piled up on her over the past few months.  Numerous other possibilities of course exist    2. Hypertension.  Stable     3. Hyperlipidemia.  On statin therapy     4. Recent pacemaker battery replacement.      5. Recent problems with palpitations.  Being evaluated by Cardiology    6. Recent rhinorrhea.  That has since resolved.  May have been allergic in origin.    7. Somewhat suspicion looking chest x-ray from July of this year.    Plan:  Will check routine blood work to include a CBC CMP lipid  sed rate TSH free T4  UA and two view chest x-ray.  Tentative follow-up 3 weeks

## 2022-09-20 NOTE — TELEPHONE ENCOUNTER
MIKE:  I saw Ms Turcios today as a new patient with complaints of generalized fatigue, chronic clear rhinorrhea, unexplained weight loss and palpitations.  She is currently also being further evaluated by her cardiologist.  Her physical exam is unremarkable.  I did send her for blood work, urinalysis and chest x-ray tomorrow.  She tells me she had a routine follow-up appointment with you scheduled for tomorrow.  I suggested that she hold off on that until I complete my evaluation.  (I had seen her many years ago in hospital situation and she requested to be reassessed.)

## 2022-09-21 ENCOUNTER — HOSPITAL ENCOUNTER (OUTPATIENT)
Dept: RADIOLOGY | Facility: HOSPITAL | Age: 77
Discharge: HOME | End: 2022-09-21
Attending: INTERNAL MEDICINE
Payer: MEDICARE

## 2022-09-21 DIAGNOSIS — J34.89 RHINORRHEA: ICD-10-CM

## 2022-09-21 DIAGNOSIS — R63.4 WEIGHT LOSS: ICD-10-CM

## 2022-09-21 DIAGNOSIS — R53.83 FATIGUE, UNSPECIFIED TYPE: ICD-10-CM

## 2022-09-21 LAB
APPEARANCE UR: CLEAR
BACTERIA #/AREA URNS AUTO: ABNORMAL /HPF
BILIRUB UR QL STRIP.AUTO: NEGATIVE MG/DL
COLOR UR AUTO: YELLOW
GLUCOSE UR QL STRIP.AUTO: NEGATIVE MG/DL
KETONES UR QL STRIP.AUTO: NEGATIVE MG/DL
LEUKOCYTE ESTERASE UR QL STRIP.AUTO: ABNORMAL UNIT/L
NITRITE UR QL STRIP.AUTO: NEGATIVE
PH UR STRIP.AUTO: 5.5 [PH]
PROT UR QL STRIP.AUTO: NEGATIVE MG/DL
RBC #/AREA URNS AUTO: <5 /HPF
RBC UR QL AUTO: ABNORMAL UNIT/L
SP GR UR STRIP.AUTO: 1.01 (ref 1–1.03)
SQUAMOUS #/AREA URNS AUTO: <5 /HPF
UROBILINOGEN UR STRIP-ACNC: 0.2 MG/DL
WBC #/AREA URNS AUTO: 56 /HPF

## 2022-09-21 PROCEDURE — 71046 X-RAY EXAM CHEST 2 VIEWS: CPT | Mod: TC

## 2022-09-21 NOTE — TELEPHONE ENCOUNTER
Discuss test results with patient.  Blood work okay except for slightly high SGOT.  Likely innocent only 1 point above normal.  Urine test however is abnormal.  She does have a UTI.  She tells me she had 2 this summer already.  She will also get results are copies of the scans from [facility] that were done earlier this summer.  Will wait on the culture results before starting antibiotics.  I will discuss with Dr. Marcos Rodríguez

## 2022-09-22 ENCOUNTER — TELEPHONE (OUTPATIENT)
Dept: INTERNAL MEDICINE | Facility: CLINIC | Age: 77
End: 2022-09-22
Payer: MEDICARE

## 2022-09-22 RX ORDER — CEFDINIR 300 MG/1
300 CAPSULE ORAL 2 TIMES DAILY
Qty: 20 CAPSULE | Refills: 0 | Status: SHIPPED | OUTPATIENT
Start: 2022-09-22 | End: 2022-10-02

## 2022-09-22 NOTE — TELEPHONE ENCOUNTER
Patient came by the office checking on status of Culture results on her U/A.  Patient requesting ABX.  Please advise.    CB#  [phone]

## 2022-09-23 LAB — BACTERIA UR CULT: ABNORMAL

## 2022-10-12 ENCOUNTER — TELEPHONE (OUTPATIENT)
Dept: INTERNAL MEDICINE | Facility: CLINIC | Age: 77
End: 2022-10-12
Payer: MEDICARE

## 2022-10-13 ENCOUNTER — OFFICE VISIT (OUTPATIENT)
Dept: INTERNAL MEDICINE | Facility: CLINIC | Age: 77
End: 2022-10-13
Payer: MEDICARE

## 2022-10-13 VITALS
WEIGHT: 144 LBS | HEART RATE: 75 BPM | RESPIRATION RATE: 18 BRPM | HEIGHT: 66 IN | BODY MASS INDEX: 23.14 KG/M2 | DIASTOLIC BLOOD PRESSURE: 77 MMHG | SYSTOLIC BLOOD PRESSURE: 129 MMHG | OXYGEN SATURATION: 98 %

## 2022-10-13 DIAGNOSIS — R53.83 FATIGUE, UNSPECIFIED TYPE: ICD-10-CM

## 2022-10-13 DIAGNOSIS — N39.0 RECURRENT UTI: Primary | ICD-10-CM

## 2022-10-13 DIAGNOSIS — E78.5 HYPERLIPIDEMIA, UNSPECIFIED HYPERLIPIDEMIA TYPE: ICD-10-CM

## 2022-10-13 DIAGNOSIS — I10 ESSENTIAL (PRIMARY) HYPERTENSION: ICD-10-CM

## 2022-10-13 DIAGNOSIS — I10 PRIMARY HYPERTENSION: ICD-10-CM

## 2022-10-13 LAB
APPEARANCE UR: CLEAR
BACTERIA #/AREA URNS AUTO: NORMAL /HPF
BILIRUB UR QL STRIP.AUTO: NEGATIVE MG/DL
COLOR UR AUTO: YELLOW
GLUCOSE UR QL STRIP.AUTO: NEGATIVE MG/DL
KETONES UR QL STRIP.AUTO: NEGATIVE MG/DL
LEUKOCYTE ESTERASE UR QL STRIP.AUTO: NEGATIVE UNIT/L
NITRITE UR QL STRIP.AUTO: NEGATIVE
PH UR STRIP.AUTO: 7 [PH]
PROT UR QL STRIP.AUTO: NEGATIVE MG/DL
RBC #/AREA URNS AUTO: <5 /HPF
RBC UR QL AUTO: NEGATIVE UNIT/L
SP GR UR STRIP.AUTO: 1.01 (ref 1–1.03)
SQUAMOUS #/AREA URNS AUTO: <5 /HPF
UROBILINOGEN UR STRIP-ACNC: 0.2 MG/DL
WBC #/AREA URNS AUTO: <5 /HPF

## 2022-10-13 PROCEDURE — 99213 PR OFFICE/OUTPT VISIT, EST, LEVL III, 20-29 MIN: ICD-10-PCS | Mod: ,,, | Performed by: INTERNAL MEDICINE

## 2022-10-13 PROCEDURE — 99213 OFFICE O/P EST LOW 20 MIN: CPT | Mod: ,,, | Performed by: INTERNAL MEDICINE

## 2022-10-13 RX ORDER — AMLODIPINE BESYLATE 2.5 MG/1
2.5 TABLET ORAL DAILY
Qty: 90 TABLET | Refills: 3 | Status: SHIPPED | OUTPATIENT
Start: 2022-10-13 | End: 2023-07-19 | Stop reason: SDUPTHER

## 2022-10-13 RX ORDER — PRAVASTATIN SODIUM 20 MG/1
20 TABLET ORAL NIGHTLY
Qty: 90 TABLET | Refills: 3 | Status: SHIPPED | OUTPATIENT
Start: 2022-10-13 | End: 2023-07-19 | Stop reason: SDUPTHER

## 2022-10-13 RX ORDER — LISINOPRIL AND HYDROCHLOROTHIAZIDE 12.5; 2 MG/1; MG/1
1 TABLET ORAL DAILY
Qty: 90 TABLET | Refills: 0 | Status: SHIPPED | OUTPATIENT
Start: 2022-10-13 | End: 2023-02-20

## 2022-10-13 RX ORDER — CEFDINIR 300 MG/1
300 CAPSULE ORAL 2 TIMES DAILY
Qty: 20 CAPSULE | Refills: 1 | Status: SHIPPED | OUTPATIENT
Start: 2022-10-13 | End: 2022-10-23

## 2022-10-13 NOTE — PROGRESS NOTES
"Subjective:       Patient ID: Jos Turcios is a 76 y.o. female.    Chief Complaint: Follow-up      The patient is a 76-year-old woman in to reassess her chronic fatigue issues.  Her workup was essentially nonrevealing except for evidence of UTI.  She was put on a short course of antibiotics and she is feeling much better.  Also spoke with her urologist regarding her recurrent UTIs.  She has had at least 3 in the last few months.  She had very few urinary tract infections in the distant past.  Dr. Ventura will reassess her regarding recurrent urinary tract infections.      She has also gained a few lb.  Overall she is feeling much better.    Follow-up    Review of Systems     Current Outpatient Medications on File Prior to Visit   Medication Sig Dispense Refill    flecainide (TAMBOCOR) 50 MG Tab 50 mg 2 (two) times daily.      metoprolol succinate (TOPROL-XL) 50 MG 24 hr tablet Take 1 tablet (50 mg total) by mouth once daily. 30 tablet 11    [DISCONTINUED] amLODIPine (NORVASC) 2.5 MG tablet Take 2.5 mg by mouth once daily.      [DISCONTINUED] lisinopriL-hydrochlorothiazide (PRINZIDE,ZESTORETIC) 20-12.5 mg per tablet TAKE ONE TABLET ONCE DAILY 90 tablet 0    [DISCONTINUED] pravastatin (PRAVACHOL) 20 MG tablet Take 20 mg by mouth nightly.       No current facility-administered medications on file prior to visit.     Objective:      /77 (BP Location: Right arm, Patient Position: Sitting, BP Method: Large (Manual))   Pulse 75   Resp 18   Ht 5' 6" (1.676 m)   Wt 65.3 kg (144 lb)   SpO2 98%   BMI 23.24 kg/m²     Physical Exam  Vitals reviewed.   Constitutional:       Appearance: Normal appearance.   HENT:      Head: Normocephalic.      Nose: Nose normal.      Mouth/Throat:      Pharynx: Oropharynx is clear.   Eyes:      Pupils: Pupils are equal, round, and reactive to light.   Neck:      Thyroid: No thyromegaly.   Cardiovascular:      Rate and Rhythm: Normal rate and regular rhythm.      Pulses: Normal " pulses.   Abdominal:      General: Abdomen is flat. Bowel sounds are normal.      Palpations: Abdomen is soft. There is no hepatomegaly, splenomegaly or mass.      Tenderness: There is no abdominal tenderness.   Musculoskeletal:      Cervical back: Neck supple.   Lymphadenopathy:      Cervical: No cervical adenopathy.   Skin:     General: Skin is warm and dry.   Neurological:      Mental Status: She is alert.       Assessment:       1. Recurrent UTI    2. Fatigue, unspecified type    3. Primary hypertension    4. Hyperlipidemia, unspecified hyperlipidemia type    5. Essential (primary) hypertension        1. Recurrent urinary tract infections.  Rule out anatomical problems.  I have discussed this with her urologist.      2. Generalized fatigue.  Much improved     3. Hypertension.  Good control    4. Hyperlipidemia.  Control    5. Hypertension.  Controlled    6. History of pacemaker.  Stable  Plan:       Will check a urinalysis today.  Will give her prescription for Omnicef 300 b.i.d. to take on her upcoming trip to the [place].  Will have her come back and see us in 3 months for follow-up.

## 2022-12-28 ENCOUNTER — LAB VISIT (OUTPATIENT)
Dept: LAB | Facility: HOSPITAL | Age: 77
End: 2022-12-28
Attending: INTERNAL MEDICINE
Payer: MEDICARE

## 2022-12-28 ENCOUNTER — TELEPHONE (OUTPATIENT)
Dept: INTERNAL MEDICINE | Facility: CLINIC | Age: 77
End: 2022-12-28
Payer: MEDICARE

## 2022-12-28 DIAGNOSIS — R30.0 DYSURIA: Primary | ICD-10-CM

## 2022-12-28 DIAGNOSIS — R30.0 DYSURIA: ICD-10-CM

## 2022-12-28 LAB
APPEARANCE UR: CLEAR
BACTERIA #/AREA URNS AUTO: ABNORMAL /HPF
BILIRUB UR QL STRIP.AUTO: NEGATIVE MG/DL
COLOR UR AUTO: YELLOW
GLUCOSE UR QL STRIP.AUTO: NEGATIVE MG/DL
KETONES UR QL STRIP.AUTO: NEGATIVE MG/DL
LEUKOCYTE ESTERASE UR QL STRIP.AUTO: ABNORMAL UNIT/L
NITRITE UR QL STRIP.AUTO: NEGATIVE
PH UR STRIP.AUTO: 7 [PH]
PROT UR QL STRIP.AUTO: NEGATIVE MG/DL
RBC #/AREA URNS AUTO: <5 /HPF
RBC UR QL AUTO: NEGATIVE UNIT/L
SP GR UR STRIP.AUTO: 1.01 (ref 1–1.03)
SQUAMOUS #/AREA URNS AUTO: <5 /HPF
UROBILINOGEN UR STRIP-ACNC: 0.2 MG/DL
WBC #/AREA URNS AUTO: 60 /HPF

## 2022-12-28 PROCEDURE — 87088 URINE BACTERIA CULTURE: CPT

## 2022-12-28 PROCEDURE — 87186 SC STD MICRODIL/AGAR DIL: CPT

## 2022-12-28 PROCEDURE — 81001 URINALYSIS AUTO W/SCOPE: CPT

## 2022-12-30 ENCOUNTER — TELEPHONE (OUTPATIENT)
Dept: INTERNAL MEDICINE | Facility: CLINIC | Age: 77
End: 2022-12-30
Payer: MEDICARE

## 2022-12-30 LAB — BACTERIA UR CULT: ABNORMAL

## 2022-12-30 RX ORDER — CEFDINIR 300 MG/1
300 CAPSULE ORAL 2 TIMES DAILY
Qty: 10 CAPSULE | Refills: 1 | Status: SHIPPED | OUTPATIENT
Start: 2022-12-30 | End: 2023-01-04

## 2022-12-30 NOTE — TELEPHONE ENCOUNTER
Tell her urine culture is growing out E coli with a benign sensitivity pattern.  I sent a prescription for Omnicef to [facility] a prescription shop

## 2023-01-09 ENCOUNTER — TELEPHONE (OUTPATIENT)
Dept: INTERNAL MEDICINE | Facility: CLINIC | Age: 78
End: 2023-01-09
Payer: MEDICARE

## 2023-01-09 NOTE — TELEPHONE ENCOUNTER
----- Message from Eva Rapp LPN sent at 1/9/2023  9:26 AM CST -----  Regarding: becca Mcdonnell 1/17 @1:20  No labs needed.

## 2023-01-17 ENCOUNTER — OFFICE VISIT (OUTPATIENT)
Dept: INTERNAL MEDICINE | Facility: CLINIC | Age: 78
End: 2023-01-17
Payer: MEDICARE

## 2023-01-17 VITALS
BODY MASS INDEX: 23.33 KG/M2 | OXYGEN SATURATION: 96 % | HEIGHT: 66 IN | DIASTOLIC BLOOD PRESSURE: 70 MMHG | SYSTOLIC BLOOD PRESSURE: 124 MMHG | RESPIRATION RATE: 18 BRPM | WEIGHT: 145.19 LBS | HEART RATE: 81 BPM

## 2023-01-17 DIAGNOSIS — I10 PRIMARY HYPERTENSION: ICD-10-CM

## 2023-01-17 DIAGNOSIS — N39.0 RECURRENT UTI: ICD-10-CM

## 2023-01-17 DIAGNOSIS — Z95.0 CARDIAC PACEMAKER IN SITU: ICD-10-CM

## 2023-01-17 DIAGNOSIS — E78.5 HYPERLIPIDEMIA, UNSPECIFIED HYPERLIPIDEMIA TYPE: ICD-10-CM

## 2023-01-17 DIAGNOSIS — R53.83 FATIGUE, UNSPECIFIED TYPE: Primary | ICD-10-CM

## 2023-01-17 PROCEDURE — 99213 OFFICE O/P EST LOW 20 MIN: CPT | Mod: ,,, | Performed by: INTERNAL MEDICINE

## 2023-01-17 PROCEDURE — 99213 PR OFFICE/OUTPT VISIT, EST, LEVL III, 20-29 MIN: ICD-10-PCS | Mod: ,,, | Performed by: INTERNAL MEDICINE

## 2023-01-17 NOTE — PROGRESS NOTES
"Subjective:       Patient ID: Jos Turcios is a 77 y.o. female.    Chief Complaint: Follow-up      The patient is a 77-year-old woman in for follow-up of fatigue.  When I 1st saw her back in September she was clean playing of severe fatigue.  I initially felt like she had problems with depression especially since she had a series of setbacks earlier in the year.  She underwent pacemaker placement for an arrhythmia.  She also had workup done on her thyroid.  She had a multinodular goiter and had 2 benign biopsies done.  This was with Dr. Sigala.  She had recurrent urinary tract infections and has seen Dr. Ball for.  It is unclear exactly what his workup was.  I did have a conversation with him and he did not remember doing a workup for recurrent UTIs.  I treated her for UTI back in September and her fatigue issues essentially resolved.  I treated her for another UTI in December.  She reportedly had at least 3 in the past year before those 2 episodes.    Follow-up    Review of Systems     Current Outpatient Medications on File Prior to Visit   Medication Sig Dispense Refill    amLODIPine (NORVASC) 2.5 MG tablet Take 1 tablet (2.5 mg total) by mouth once daily. 90 tablet 3    flecainide (TAMBOCOR) 50 MG Tab 50 mg 2 (two) times daily.      metoprolol succinate (TOPROL-XL) 50 MG 24 hr tablet Take 1 tablet (50 mg total) by mouth once daily. 30 tablet 11    pravastatin (PRAVACHOL) 20 MG tablet Take 1 tablet (20 mg total) by mouth nightly. 90 tablet 3    lisinopriL-hydrochlorothiazide (PRINZIDE,ZESTORETIC) 20-12.5 mg per tablet Take 1 tablet by mouth once daily. 90 tablet 0     No current facility-administered medications on file prior to visit.     Objective:      /70 (BP Location: Right arm, Patient Position: Sitting, BP Method: Large (Manual))   Pulse 81   Resp 18   Ht 5' 6" (1.676 m)   Wt 65.9 kg (145 lb 3.2 oz)   SpO2 96%   BMI 23.44 kg/m²     Physical Exam  Vitals reviewed.   Constitutional:       " Appearance: Normal appearance.   HENT:      Head: Normocephalic.      Nose: Nose normal.      Mouth/Throat:      Pharynx: Oropharynx is clear.   Eyes:      Pupils: Pupils are equal, round, and reactive to light.   Neck:      Thyroid: No thyromegaly.   Cardiovascular:      Rate and Rhythm: Normal rate and regular rhythm.      Pulses: Normal pulses.   Abdominal:      General: Abdomen is flat. Bowel sounds are normal.      Palpations: Abdomen is soft. There is no hepatomegaly, splenomegaly or mass.      Tenderness: There is no abdominal tenderness.   Musculoskeletal:      Cervical back: Neck supple.   Lymphadenopathy:      Cervical: No cervical adenopathy.   Skin:     General: Skin is warm and dry.   Neurological:      Mental Status: She is alert.     Prior test results reviewed.  Assessment:       1. Fatigue, unspecified type    2. Recurrent UTI    3. Primary hypertension    4. Hyperlipidemia, unspecified hyperlipidemia type    5. Cardiac pacemaker in situ          Plan:     1. Fatigue.  Overall much improved.  Likely secondary to 2.     2. Recurrent urinary tract infections.  Reason for recurrent nature is unclear.  Likely an anatomical problem.      3. Hypertension.  Control    4. Hyperlipidemia.  Controlled    5. History of pacemaker.  Stable     6. Recent history of hair loss.  Seems to be resolving.  Suspect stress phenomena    7. Multinodular goiter     Continue same meds.  Schedule ultrasound kidneys.  Refer back to Dr. Ball for to assess her lower tract regarding recurrent UTIs.    Follow-up with me 6 months with CBC CMP lipid TSH UA prior

## 2023-01-23 ENCOUNTER — DOCUMENTATION ONLY (OUTPATIENT)
Dept: INTERNAL MEDICINE | Facility: CLINIC | Age: 78
End: 2023-01-23
Payer: MEDICARE

## 2023-01-24 ENCOUNTER — TELEPHONE (OUTPATIENT)
Dept: INTERNAL MEDICINE | Facility: CLINIC | Age: 78
End: 2023-01-24
Payer: MEDICARE

## 2023-01-24 NOTE — TELEPHONE ENCOUNTER
----- Message from Kolton Smith MD sent at 1/23/2023  1:20 PM CST -----  Tell her ultrasound of the kidneys show no change in bilateral cysts.  These are felt to be benign.  There is some scarring on the right kidney inferior portion secondary to prior kidney infections.  This area of scarring has not changed compared to the prior study.  So nothing on the upper tract to explain prior infections.  We have referred her to Dr. Ventura to assess the lower tract  ----- Message -----  From: Eva Rapp LPN  Sent: 1/23/2023  11:29 AM CST  To: Kolton Smith MD

## 2023-02-17 DIAGNOSIS — I10 ESSENTIAL (PRIMARY) HYPERTENSION: ICD-10-CM

## 2023-02-20 RX ORDER — LISINOPRIL AND HYDROCHLOROTHIAZIDE 12.5; 2 MG/1; MG/1
TABLET ORAL
Qty: 90 TABLET | Refills: 0 | Status: SHIPPED | OUTPATIENT
Start: 2023-02-20 | End: 2023-05-30

## 2023-05-24 ENCOUNTER — TELEPHONE (OUTPATIENT)
Dept: INTERNAL MEDICINE | Facility: CLINIC | Age: 78
End: 2023-05-24
Payer: MEDICARE

## 2023-05-24 DIAGNOSIS — N39.0 RECURRENT UTI: Primary | ICD-10-CM

## 2023-05-24 RX ORDER — CIPROFLOXACIN 250 MG/1
250 TABLET, FILM COATED ORAL 2 TIMES DAILY
Qty: 10 TABLET | Refills: 0 | Status: SHIPPED | OUTPATIENT
Start: 2023-05-24 | End: 2023-07-19

## 2023-05-26 DIAGNOSIS — I10 ESSENTIAL (PRIMARY) HYPERTENSION: ICD-10-CM

## 2023-05-30 RX ORDER — LISINOPRIL AND HYDROCHLOROTHIAZIDE 12.5; 2 MG/1; MG/1
TABLET ORAL
Qty: 90 TABLET | Refills: 0 | Status: SHIPPED | OUTPATIENT
Start: 2023-05-30 | End: 2023-07-19 | Stop reason: SDUPTHER

## 2023-07-12 ENCOUNTER — TELEPHONE (OUTPATIENT)
Dept: INTERNAL MEDICINE | Facility: CLINIC | Age: 78
End: 2023-07-12

## 2023-07-12 NOTE — TELEPHONE ENCOUNTER
----- Message from Eva Rapp LPN sent at 7/12/2023  7:39 AM CDT -----  Regarding: becca Mcdonnell 7/19 @3:00  Fasting labs needed.

## 2023-07-19 ENCOUNTER — LAB VISIT (OUTPATIENT)
Dept: LAB | Facility: HOSPITAL | Age: 78
End: 2023-07-19
Attending: INTERNAL MEDICINE
Payer: MEDICARE

## 2023-07-19 ENCOUNTER — OFFICE VISIT (OUTPATIENT)
Dept: INTERNAL MEDICINE | Facility: CLINIC | Age: 78
End: 2023-07-19
Payer: MEDICARE

## 2023-07-19 VITALS
HEIGHT: 66 IN | DIASTOLIC BLOOD PRESSURE: 66 MMHG | WEIGHT: 146.81 LBS | BODY MASS INDEX: 23.59 KG/M2 | HEART RATE: 72 BPM | RESPIRATION RATE: 18 BRPM | SYSTOLIC BLOOD PRESSURE: 122 MMHG | OXYGEN SATURATION: 96 %

## 2023-07-19 DIAGNOSIS — E78.5 HYPERLIPIDEMIA, UNSPECIFIED HYPERLIPIDEMIA TYPE: Primary | ICD-10-CM

## 2023-07-19 DIAGNOSIS — I10 ESSENTIAL (PRIMARY) HYPERTENSION: ICD-10-CM

## 2023-07-19 DIAGNOSIS — R53.83 FATIGUE, UNSPECIFIED TYPE: ICD-10-CM

## 2023-07-19 DIAGNOSIS — I10 PRIMARY HYPERTENSION: ICD-10-CM

## 2023-07-19 DIAGNOSIS — E78.5 HYPERLIPIDEMIA, UNSPECIFIED HYPERLIPIDEMIA TYPE: ICD-10-CM

## 2023-07-19 DIAGNOSIS — Z95.0 CARDIAC PACEMAKER IN SITU: ICD-10-CM

## 2023-07-19 DIAGNOSIS — E04.2 GOITER, NONTOXIC, MULTINODULAR: ICD-10-CM

## 2023-07-19 DIAGNOSIS — R53.83 FATIGUE, UNSPECIFIED TYPE: Primary | ICD-10-CM

## 2023-07-19 DIAGNOSIS — N39.0 RECURRENT UTI: ICD-10-CM

## 2023-07-19 LAB
ALBUMIN SERPL-MCNC: 4.1 G/DL (ref 3.4–4.8)
ALBUMIN/GLOB SERPL: 1.2 RATIO (ref 1.1–2)
ALP SERPL-CCNC: 71 UNIT/L (ref 40–150)
ALT SERPL-CCNC: 24 UNIT/L (ref 0–55)
AST SERPL-CCNC: 31 UNIT/L (ref 5–34)
BASOPHILS # BLD AUTO: 0.04 X10(3)/MCL
BASOPHILS NFR BLD AUTO: 0.7 %
BILIRUBIN DIRECT+TOT PNL SERPL-MCNC: 0.5 MG/DL
BUN SERPL-MCNC: 29.8 MG/DL (ref 9.8–20.1)
CALCIUM SERPL-MCNC: 10.3 MG/DL (ref 8.4–10.2)
CHLORIDE SERPL-SCNC: 105 MMOL/L (ref 98–107)
CHOLEST SERPL-MCNC: 189 MG/DL
CHOLEST/HDLC SERPL: 3 {RATIO} (ref 0–5)
CO2 SERPL-SCNC: 25 MMOL/L (ref 23–31)
CREAT SERPL-MCNC: 1.29 MG/DL (ref 0.55–1.02)
EOSINOPHIL # BLD AUTO: 0.1 X10(3)/MCL (ref 0–0.9)
EOSINOPHIL NFR BLD AUTO: 1.7 %
ERYTHROCYTE [DISTWIDTH] IN BLOOD BY AUTOMATED COUNT: 13.2 % (ref 11.5–17)
GFR SERPLBLD CREATININE-BSD FMLA CKD-EPI: 42 MLS/MIN/1.73/M2
GLOBULIN SER-MCNC: 3.4 GM/DL (ref 2.4–3.5)
GLUCOSE SERPL-MCNC: 100 MG/DL (ref 82–115)
HCT VFR BLD AUTO: 41.4 % (ref 37–47)
HDLC SERPL-MCNC: 63 MG/DL (ref 35–60)
HGB BLD-MCNC: 13.3 G/DL (ref 12–16)
IMM GRANULOCYTES # BLD AUTO: 0.02 X10(3)/MCL (ref 0–0.04)
IMM GRANULOCYTES NFR BLD AUTO: 0.3 %
LDLC SERPL CALC-MCNC: 103 MG/DL (ref 50–140)
LYMPHOCYTES # BLD AUTO: 1.6 X10(3)/MCL (ref 0.6–4.6)
LYMPHOCYTES NFR BLD AUTO: 27.5 %
MCH RBC QN AUTO: 29.7 PG (ref 27–31)
MCHC RBC AUTO-ENTMCNC: 32.1 G/DL (ref 33–36)
MCV RBC AUTO: 92.4 FL (ref 80–94)
MONOCYTES # BLD AUTO: 0.6 X10(3)/MCL (ref 0.1–1.3)
MONOCYTES NFR BLD AUTO: 10.3 %
NEUTROPHILS # BLD AUTO: 3.45 X10(3)/MCL (ref 2.1–9.2)
NEUTROPHILS NFR BLD AUTO: 59.5 %
NRBC BLD AUTO-RTO: 0 %
PLATELET # BLD AUTO: 154 X10(3)/MCL (ref 130–400)
PMV BLD AUTO: 9.9 FL (ref 7.4–10.4)
POTASSIUM SERPL-SCNC: 4.4 MMOL/L (ref 3.5–5.1)
PROT SERPL-MCNC: 7.5 GM/DL (ref 5.8–7.6)
RBC # BLD AUTO: 4.48 X10(6)/MCL (ref 4.2–5.4)
SODIUM SERPL-SCNC: 140 MMOL/L (ref 136–145)
TRIGL SERPL-MCNC: 115 MG/DL (ref 37–140)
TSH SERPL-ACNC: 1.31 UIU/ML (ref 0.35–4.94)
VLDLC SERPL CALC-MCNC: 23 MG/DL
WBC # SPEC AUTO: 5.81 X10(3)/MCL (ref 4.5–11.5)

## 2023-07-19 PROCEDURE — 80053 COMPREHEN METABOLIC PANEL: CPT

## 2023-07-19 PROCEDURE — 84443 ASSAY THYROID STIM HORMONE: CPT

## 2023-07-19 PROCEDURE — 85025 COMPLETE CBC W/AUTO DIFF WBC: CPT

## 2023-07-19 PROCEDURE — 99214 OFFICE O/P EST MOD 30 MIN: CPT | Mod: ,,, | Performed by: INTERNAL MEDICINE

## 2023-07-19 PROCEDURE — 36415 COLL VENOUS BLD VENIPUNCTURE: CPT

## 2023-07-19 PROCEDURE — 99214 PR OFFICE/OUTPT VISIT, EST, LEVL IV, 30-39 MIN: ICD-10-PCS | Mod: ,,, | Performed by: INTERNAL MEDICINE

## 2023-07-19 PROCEDURE — 80061 LIPID PANEL: CPT

## 2023-07-19 RX ORDER — AMLODIPINE BESYLATE 2.5 MG/1
2.5 TABLET ORAL DAILY
Qty: 90 TABLET | Refills: 3 | Status: SHIPPED | OUTPATIENT
Start: 2023-07-19

## 2023-07-19 RX ORDER — PRAVASTATIN SODIUM 20 MG/1
20 TABLET ORAL NIGHTLY
Qty: 90 TABLET | Refills: 3 | Status: SHIPPED | OUTPATIENT
Start: 2023-07-19 | End: 2023-07-19 | Stop reason: SDUPTHER

## 2023-07-19 RX ORDER — PRAVASTATIN SODIUM 40 MG/1
40 TABLET ORAL NIGHTLY
Qty: 90 TABLET | Refills: 3 | Status: SHIPPED | OUTPATIENT
Start: 2023-07-19

## 2023-07-19 RX ORDER — LISINOPRIL AND HYDROCHLOROTHIAZIDE 12.5; 2 MG/1; MG/1
1 TABLET ORAL DAILY
Qty: 90 TABLET | Refills: 3 | Status: SHIPPED | OUTPATIENT
Start: 2023-07-19

## 2023-07-19 NOTE — PROGRESS NOTES
"Subjective:       Patient ID: Jos Turcios is a 77 y.o. female.    Chief Complaint: Follow-up      Patient is a 77-year-old woman in for follow-up multiple problems.  Overall she is feeling fine.  She has no complaints.  She claimed that her diet and exercise levels have not changed.  She remains active and she has a very healthy diet.    Follow-up    Review of Systems     Current Outpatient Medications on File Prior to Visit   Medication Sig Dispense Refill    metoprolol succinate (TOPROL-XL) 50 MG 24 hr tablet Take 1 tablet (50 mg total) by mouth once daily. 30 tablet 11    [DISCONTINUED] amLODIPine (NORVASC) 2.5 MG tablet Take 1 tablet (2.5 mg total) by mouth once daily. 90 tablet 3    [DISCONTINUED] lisinopriL-hydrochlorothiazide (PRINZIDE,ZESTORETIC) 20-12.5 mg per tablet TAKE ONE TABLET ONCE DAILY 90 tablet 0    [DISCONTINUED] pravastatin (PRAVACHOL) 20 MG tablet Take 1 tablet (20 mg total) by mouth nightly. 90 tablet 3    flecainide (TAMBOCOR) 50 MG Tab 50 mg 2 (two) times daily.      [DISCONTINUED] ciprofloxacin HCl (CIPRO) 250 MG tablet Take 1 tablet (250 mg total) by mouth 2 (two) times daily. (Patient not taking: Reported on 7/19/2023) 10 tablet 0     No current facility-administered medications on file prior to visit.     Objective:      /66 (BP Location: Right arm, Patient Position: Sitting, BP Method: Large (Manual))   Pulse 72   Resp 18   Ht 5' 6" (1.676 m)   Wt 66.6 kg (146 lb 12.8 oz)   SpO2 96%   BMI 23.69 kg/m²     Physical Exam  Vitals reviewed.   Constitutional:       Appearance: Normal appearance.   HENT:      Head: Normocephalic.      Nose: Nose normal.      Mouth/Throat:      Pharynx: Oropharynx is clear.   Eyes:      Pupils: Pupils are equal, round, and reactive to light.   Neck:      Thyroid: No thyromegaly.   Cardiovascular:      Rate and Rhythm: Normal rate and regular rhythm.      Pulses: Normal pulses.   Abdominal:      General: Abdomen is flat. Bowel sounds are normal. "      Palpations: Abdomen is soft. There is no hepatomegaly, splenomegaly or mass.      Tenderness: There is no abdominal tenderness.   Musculoskeletal:      Cervical back: Neck supple.   Lymphadenopathy:      Cervical: No cervical adenopathy.   Skin:     General: Skin is warm and dry.   Neurological:      Mental Status: She is alert.     Laboratory studies reviewed.  Assessment:       1. Hypertension.  Excellent control    2. Hyperlipidemia.  Worse control and not at goal despite being on low-dose statin therapy.      3. History of cardiac pacemaker.  Doing well and asymptomatic    4. Nontoxic multinodular goiter.  Stable  Plan:     Increased TLC of possible.  Increase pravastatin to 40 at bedtime.  Follow-up with me 6 months with CBC CMP lipid TSH prior

## 2024-01-17 ENCOUNTER — TELEPHONE (OUTPATIENT)
Dept: INTERNAL MEDICINE | Facility: CLINIC | Age: 79
End: 2024-01-17
Payer: MEDICARE

## 2024-01-17 NOTE — TELEPHONE ENCOUNTER
----- Message from Eva Rapp LPN sent at 1/16/2024  2:29 PM CST -----  Regarding: becca Mcdonnell 1/22 @1:20  Fasting labs needed.

## 2024-01-18 ENCOUNTER — LAB VISIT (OUTPATIENT)
Dept: LAB | Facility: HOSPITAL | Age: 79
End: 2024-01-18
Attending: INTERNAL MEDICINE
Payer: MEDICARE

## 2024-01-18 DIAGNOSIS — I10 PRIMARY HYPERTENSION: ICD-10-CM

## 2024-01-18 DIAGNOSIS — E78.5 HYPERLIPIDEMIA, UNSPECIFIED HYPERLIPIDEMIA TYPE: ICD-10-CM

## 2024-01-18 DIAGNOSIS — Z95.0 CARDIAC PACEMAKER IN SITU: ICD-10-CM

## 2024-01-18 DIAGNOSIS — E04.2 GOITER, NONTOXIC, MULTINODULAR: ICD-10-CM

## 2024-01-18 LAB
ALBUMIN SERPL-MCNC: 3.9 G/DL (ref 3.4–4.8)
ALBUMIN/GLOB SERPL: 1.2 RATIO (ref 1.1–2)
ALP SERPL-CCNC: 68 UNIT/L (ref 40–150)
ALT SERPL-CCNC: 19 UNIT/L (ref 0–55)
AST SERPL-CCNC: 26 UNIT/L (ref 5–34)
BASOPHILS # BLD AUTO: 0.05 X10(3)/MCL
BASOPHILS NFR BLD AUTO: 0.9 %
BILIRUB SERPL-MCNC: 0.5 MG/DL
BUN SERPL-MCNC: 20.5 MG/DL (ref 9.8–20.1)
CALCIUM SERPL-MCNC: 10 MG/DL (ref 8.4–10.2)
CHLORIDE SERPL-SCNC: 107 MMOL/L (ref 98–107)
CHOLEST SERPL-MCNC: 170 MG/DL
CHOLEST/HDLC SERPL: 3 {RATIO} (ref 0–5)
CO2 SERPL-SCNC: 31 MMOL/L (ref 23–31)
CREAT SERPL-MCNC: 1.18 MG/DL (ref 0.55–1.02)
EOSINOPHIL # BLD AUTO: 0.11 X10(3)/MCL (ref 0–0.9)
EOSINOPHIL NFR BLD AUTO: 2 %
ERYTHROCYTE [DISTWIDTH] IN BLOOD BY AUTOMATED COUNT: 13.2 % (ref 11.5–17)
GFR SERPLBLD CREATININE-BSD FMLA CKD-EPI: 47 MLS/MIN/1.73/M2
GLOBULIN SER-MCNC: 3.2 GM/DL (ref 2.4–3.5)
GLUCOSE SERPL-MCNC: 97 MG/DL (ref 82–115)
HCT VFR BLD AUTO: 41.5 % (ref 37–47)
HDLC SERPL-MCNC: 64 MG/DL (ref 35–60)
HGB BLD-MCNC: 13.5 G/DL (ref 12–16)
IMM GRANULOCYTES # BLD AUTO: 0.01 X10(3)/MCL (ref 0–0.04)
IMM GRANULOCYTES NFR BLD AUTO: 0.2 %
LDLC SERPL CALC-MCNC: 84 MG/DL (ref 50–140)
LYMPHOCYTES # BLD AUTO: 1.64 X10(3)/MCL (ref 0.6–4.6)
LYMPHOCYTES NFR BLD AUTO: 30.5 %
MCH RBC QN AUTO: 29.5 PG (ref 27–31)
MCHC RBC AUTO-ENTMCNC: 32.5 G/DL (ref 33–36)
MCV RBC AUTO: 90.8 FL (ref 80–94)
MONOCYTES # BLD AUTO: 0.66 X10(3)/MCL (ref 0.1–1.3)
MONOCYTES NFR BLD AUTO: 12.3 %
NEUTROPHILS # BLD AUTO: 2.91 X10(3)/MCL (ref 2.1–9.2)
NEUTROPHILS NFR BLD AUTO: 54.1 %
NRBC BLD AUTO-RTO: 0 %
PLATELET # BLD AUTO: 174 X10(3)/MCL (ref 130–400)
PMV BLD AUTO: 10.1 FL (ref 7.4–10.4)
POTASSIUM SERPL-SCNC: 4.3 MMOL/L (ref 3.5–5.1)
PROT SERPL-MCNC: 7.1 GM/DL (ref 5.8–7.6)
RBC # BLD AUTO: 4.57 X10(6)/MCL (ref 4.2–5.4)
SODIUM SERPL-SCNC: 140 MMOL/L (ref 136–145)
TRIGL SERPL-MCNC: 112 MG/DL (ref 37–140)
TSH SERPL-ACNC: 1.77 UIU/ML (ref 0.35–4.94)
VLDLC SERPL CALC-MCNC: 22 MG/DL
WBC # SPEC AUTO: 5.38 X10(3)/MCL (ref 4.5–11.5)

## 2024-01-18 PROCEDURE — 80061 LIPID PANEL: CPT

## 2024-01-18 PROCEDURE — 84443 ASSAY THYROID STIM HORMONE: CPT

## 2024-01-18 PROCEDURE — 85025 COMPLETE CBC W/AUTO DIFF WBC: CPT

## 2024-01-18 PROCEDURE — 36415 COLL VENOUS BLD VENIPUNCTURE: CPT

## 2024-01-18 PROCEDURE — 80053 COMPREHEN METABOLIC PANEL: CPT

## 2024-01-22 ENCOUNTER — OFFICE VISIT (OUTPATIENT)
Dept: INTERNAL MEDICINE | Facility: CLINIC | Age: 79
End: 2024-01-22
Payer: MEDICARE

## 2024-01-22 VITALS
DIASTOLIC BLOOD PRESSURE: 70 MMHG | OXYGEN SATURATION: 98 % | HEART RATE: 74 BPM | RESPIRATION RATE: 18 BRPM | WEIGHT: 146.19 LBS | BODY MASS INDEX: 23.49 KG/M2 | HEIGHT: 66 IN | SYSTOLIC BLOOD PRESSURE: 128 MMHG

## 2024-01-22 DIAGNOSIS — E78.5 HYPERLIPIDEMIA, UNSPECIFIED HYPERLIPIDEMIA TYPE: ICD-10-CM

## 2024-01-22 DIAGNOSIS — E04.2 GOITER, NONTOXIC, MULTINODULAR: ICD-10-CM

## 2024-01-22 DIAGNOSIS — Z23 NEED FOR VACCINATION: Primary | ICD-10-CM

## 2024-01-22 DIAGNOSIS — Z00.00 WELLNESS EXAMINATION: ICD-10-CM

## 2024-01-22 DIAGNOSIS — Z95.0 CARDIAC PACEMAKER IN SITU: ICD-10-CM

## 2024-01-22 DIAGNOSIS — I10 PRIMARY HYPERTENSION: ICD-10-CM

## 2024-01-22 PROCEDURE — G0008 ADMIN INFLUENZA VIRUS VAC: HCPCS | Mod: ,,, | Performed by: INTERNAL MEDICINE

## 2024-01-22 PROCEDURE — G0439 PPPS, SUBSEQ VISIT: HCPCS | Mod: ,,, | Performed by: INTERNAL MEDICINE

## 2024-01-22 PROCEDURE — 90694 VACC AIIV4 NO PRSRV 0.5ML IM: CPT | Mod: ,,, | Performed by: INTERNAL MEDICINE

## 2024-01-22 NOTE — PROGRESS NOTES
"Subjective:      Patient Care Team:  Kolton Smith MD as PCP - General (Internal Medicine)      Patient ID: Jos Turcios is a 78 y.o. female.    Chief Complaint: Medicare AWV      Is an 78-year-old woman in for wellness check.  She feels fine overall.  No complaints whatsoever.      Review of Systems   All other systems reviewed and are negative.       Current Outpatient Medications on File Prior to Visit   Medication Sig Dispense Refill    amLODIPine (NORVASC) 2.5 MG tablet Take 1 tablet (2.5 mg total) by mouth once daily. 90 tablet 3    flecainide (TAMBOCOR) 50 MG Tab 50 mg 2 (two) times daily.      lisinopriL-hydrochlorothiazide (PRINZIDE,ZESTORETIC) 20-12.5 mg per tablet Take 1 tablet by mouth once daily. 90 tablet 3    metoprolol succinate (TOPROL-XL) 50 MG 24 hr tablet Take 1 tablet (50 mg total) by mouth once daily. 30 tablet 11    pravastatin (PRAVACHOL) 40 MG tablet Take 1 tablet (40 mg total) by mouth every evening. 90 tablet 3     No current facility-administered medications on file prior to visit.       Patient Reported Health Risk Assessment       Opioid Screening: Patient medication list reviewed, patient is not taking prescription opioids. Patient is not using additional opioids than prescribed. Patient is at low risk of substance abuse based on this opioid use history.       Objective:      /70 (BP Location: Right arm, Patient Position: Sitting, BP Method: Large (Manual))   Pulse 74   Resp 18   Ht 5' 6" (1.676 m)   Wt 66.3 kg (146 lb 3.2 oz)   SpO2 98%   BMI 23.60 kg/m²     Physical Exam  Vitals reviewed.   Constitutional:       Appearance: Normal appearance.   HENT:      Head: Normocephalic.      Nose: Nose normal.      Mouth/Throat:      Pharynx: Oropharynx is clear.   Eyes:      Pupils: Pupils are equal, round, and reactive to light.   Neck:      Thyroid: No thyromegaly.   Cardiovascular:      Rate and Rhythm: Normal rate and regular rhythm.      Pulses: Normal pulses. "   Abdominal:      General: Abdomen is flat. Bowel sounds are normal.      Palpations: Abdomen is soft. There is no hepatomegaly, splenomegaly or mass.      Tenderness: There is no abdominal tenderness.   Musculoskeletal:      Cervical back: Neck supple.   Lymphadenopathy:      Cervical: No cervical adenopathy.   Skin:     General: Skin is warm and dry.   Neurological:      Mental Status: She is alert.         Lab work reviewed         No data to display                  1/22/2024     1:20 PM 7/19/2023     3:00 PM 1/17/2023     2:00 PM 10/13/2022     1:20 PM 8/16/2022    11:30 AM   Fall Risk Assessment - Outpatient   Mobility Status Ambulatory Ambulatory Ambulatory Ambulatory Ambulatory   Number of falls 0 0 0 0 0   Identified as fall risk False False False False False                Assessment:       1. Wellness    2. Hypertension.  Excellent control    3. Hyperlipidemia.  Excellent control, improved on increased dose pravastatin     4. History of pacemaker.      5. History of nontoxic goiter.  Stable clinically and by exam.  Plan:     Continue current meds.  Follow-up with me 6 months with CBC CMP lipid TSH prior          Medicare Annual Wellness and Personalized Prevention Plan:   Fall Risk + Home Safety + Hearing Impairment + Depression Screen + Cognitive Impairment Screen + Health Risk Assessment all reviewed.       Health Maintenance Topics with due status: Not Due       Topic Last Completion Date    TETANUS VACCINE 03/26/2020    DEXA Scan 04/06/2023    Lipid Panel 01/18/2024      The patient's Health Maintenance was reviewed and the following appears to be due at this time:   Health Maintenance Due   Topic Date Due    RSV Vaccine (Age 60+ and Pregnant patients) (1 - 1-dose 60+ series) Never done    COVID-19 Vaccine (4 - 2023-24 season) 09/01/2023         Advance Care Planning   I attest to discussing Advance Care Planning with patient and/or family member.  Education was provided including the importance of  the Health Care Power of , Advance Directives, and/or [place] documentation.  The patient expressed understanding to the importance of this information and discussion.  Length of ACP conversation in minutes:          Follow up in about 6 months (around 7/22/2024). In addition to their scheduled follow up, the patient has also been instructed to follow up on as needed basis.

## 2024-02-14 ENCOUNTER — TELEPHONE (OUTPATIENT)
Dept: INTERNAL MEDICINE | Facility: CLINIC | Age: 79
End: 2024-02-14
Payer: MEDICARE

## 2024-02-14 NOTE — TELEPHONE ENCOUNTER
----- Message from Kolton Smith MD sent at 2/14/2024  4:50 PM CST -----  Regarding: RE: med advice  Tell her that it was not safe for her take the usual medicine we used for COVID because of the Tambocor which can not be stopped.  Therefore recommend symptomatic treatment only such as Mucinex DM, and p.r.n. Tylenol.  ----- Message -----  From: Eva Rapp LPN  Sent: 2/14/2024   2:28 PM CST  To: Kolton Smith MD  Subject: FW: med advice                                   Pt stated she went to  yesterday and tested positive for Covid. When I looked at the documentation, she didn't test for Covid at all, she tested negative for Strep. Please advise.  ----- Message -----  From: Daniel Doyle MA  Sent: 2/14/2024   2:02 PM CST  To: Kolton Smith MD  Subject: FW: med advice                                     ----- Message -----  From: Patricia Mathis  Sent: 2/14/2024   2:01 PM CST  To: Luis AGUILA Staff  Subject: med advice                                       .Type:  Needs Medical Advice    Who Called: Pt  Symptoms (please be specific): Sore throat, ear ache, cough  How long has patient had these symptoms:  1 day  Pharmacy name and phone #: [facility] - [place], [place] - [address] [place] [address]   Would the patient rather a call back or a response via [facility]? Call back  Best Call Back Number: [phone]  Additional Information: Pt stated she went to urgent care and tested positive for covid, nothing was prescribed to pt, she wants to know if Dr Smith is willing to call her something in.

## 2024-02-23 ENCOUNTER — TELEPHONE (OUTPATIENT)
Dept: INTERNAL MEDICINE | Facility: CLINIC | Age: 79
End: 2024-02-23
Payer: MEDICARE

## 2024-02-23 DIAGNOSIS — R05.9 COUGH, UNSPECIFIED TYPE: Primary | ICD-10-CM

## 2024-02-23 RX ORDER — AZITHROMYCIN 250 MG/1
TABLET, FILM COATED ORAL
Qty: 6 TABLET | Refills: 0 | Status: SHIPPED | OUTPATIENT
Start: 2024-02-23 | End: 2024-02-28

## 2024-02-23 NOTE — TELEPHONE ENCOUNTER
Patient c/o cough with grey mucus.  Patient finished taking mucinex after 6 days.  Patient requesting a recommendation from Dr. Smith.   Please advise.

## 2024-02-23 NOTE — TELEPHONE ENCOUNTER
----- Message from Kasie Ayers sent at 2/23/2024 11:08 AM CST -----  .Type:  Needs Medical Advice    Who Called: pt  Symptoms (please be specific): cough   How long has patient had these symptoms:    Pharmacy name and phone #:  [facility]  Would the patient rather a call back or a response via [facility]?   Best Call Back Number: [phone]  Additional Information: pt said pcp sent her meds for covid and she took it but still have cough

## 2024-05-28 DIAGNOSIS — I10 ESSENTIAL (PRIMARY) HYPERTENSION: ICD-10-CM

## 2024-05-28 RX ORDER — LISINOPRIL AND HYDROCHLOROTHIAZIDE 12.5; 2 MG/1; MG/1
1 TABLET ORAL
Qty: 90 TABLET | Refills: 3 | Status: SHIPPED | OUTPATIENT
Start: 2024-05-28

## 2024-06-19 DIAGNOSIS — E78.5 HYPERLIPIDEMIA, UNSPECIFIED HYPERLIPIDEMIA TYPE: ICD-10-CM

## 2024-06-19 RX ORDER — PRAVASTATIN SODIUM 40 MG/1
40 TABLET ORAL NIGHTLY
Qty: 90 TABLET | Refills: 3 | Status: SHIPPED | OUTPATIENT
Start: 2024-06-19

## 2024-07-22 ENCOUNTER — TELEPHONE (OUTPATIENT)
Dept: INTERNAL MEDICINE | Facility: CLINIC | Age: 79
End: 2024-07-22
Payer: MEDICARE

## 2024-07-22 NOTE — TELEPHONE ENCOUNTER
----- Message from Eva Rapp LPN sent at 7/22/2024 12:59 PM CDT -----  Regarding: becca Elder 07/29 @1:20  Fasting labs needed.

## 2024-07-26 ENCOUNTER — LAB VISIT (OUTPATIENT)
Dept: LAB | Facility: HOSPITAL | Age: 79
End: 2024-07-26
Attending: INTERNAL MEDICINE
Payer: MEDICARE

## 2024-07-26 DIAGNOSIS — Z95.0 CARDIAC PACEMAKER IN SITU: ICD-10-CM

## 2024-07-26 DIAGNOSIS — I10 PRIMARY HYPERTENSION: ICD-10-CM

## 2024-07-26 DIAGNOSIS — E78.5 HYPERLIPIDEMIA, UNSPECIFIED HYPERLIPIDEMIA TYPE: ICD-10-CM

## 2024-07-26 LAB
ALBUMIN SERPL-MCNC: 3.7 G/DL (ref 3.4–4.8)
ALBUMIN/GLOB SERPL: 1.2 RATIO (ref 1.1–2)
ALP SERPL-CCNC: 44 UNIT/L (ref 40–150)
ALT SERPL-CCNC: 18 UNIT/L (ref 0–55)
ANION GAP SERPL CALC-SCNC: 5 MEQ/L
AST SERPL-CCNC: 25 UNIT/L (ref 5–34)
BASOPHILS # BLD AUTO: 0.04 X10(3)/MCL
BASOPHILS NFR BLD AUTO: 0.7 %
BILIRUB SERPL-MCNC: 0.6 MG/DL
BUN SERPL-MCNC: 19.9 MG/DL (ref 9.8–20.1)
CALCIUM SERPL-MCNC: 9.9 MG/DL (ref 8.4–10.2)
CHLORIDE SERPL-SCNC: 104 MMOL/L (ref 98–107)
CHOLEST SERPL-MCNC: 178 MG/DL
CHOLEST/HDLC SERPL: 3 {RATIO} (ref 0–5)
CO2 SERPL-SCNC: 28 MMOL/L (ref 23–31)
CREAT SERPL-MCNC: 1.21 MG/DL (ref 0.55–1.02)
CREAT/UREA NIT SERPL: 16
EOSINOPHIL # BLD AUTO: 0.15 X10(3)/MCL (ref 0–0.9)
EOSINOPHIL NFR BLD AUTO: 2.6 %
ERYTHROCYTE [DISTWIDTH] IN BLOOD BY AUTOMATED COUNT: 13.1 % (ref 11.5–17)
GFR SERPLBLD CREATININE-BSD FMLA CKD-EPI: 45 ML/MIN/1.73/M2
GLOBULIN SER-MCNC: 3 GM/DL (ref 2.4–3.5)
GLUCOSE SERPL-MCNC: 95 MG/DL (ref 82–115)
HCT VFR BLD AUTO: 40.8 % (ref 37–47)
HDLC SERPL-MCNC: 61 MG/DL (ref 35–60)
HGB BLD-MCNC: 13.1 G/DL (ref 12–16)
IMM GRANULOCYTES # BLD AUTO: 0.01 X10(3)/MCL (ref 0–0.04)
IMM GRANULOCYTES NFR BLD AUTO: 2 %
LDLC SERPL CALC-MCNC: 97 MG/DL (ref 50–140)
LYMPHOCYTES # BLD AUTO: 1.38 X10(3)/MCL (ref 0.6–4.6)
LYMPHOCYTES NFR BLD AUTO: 23.9 %
MCH RBC QN AUTO: 29.6 PG (ref 27–31)
MCHC RBC AUTO-ENTMCNC: 32.1 G/DL (ref 33–36)
MCV RBC AUTO: 92.3 FL (ref 80–94)
MONOCYTES # BLD AUTO: 0.59 X10(3)/MCL (ref 0.1–1.3)
MONOCYTES NFR BLD AUTO: 10.2 %
NEUTROPHILS # BLD AUTO: 3.6 X10(3)/MCL (ref 2.1–9.2)
NEUTROPHILS NFR BLD AUTO: 62.4 %
NRBC BLD AUTO-RTO: 0 %
PLATELET # BLD AUTO: 173 X10(3)/MCL (ref 130–400)
PMV BLD AUTO: 10.2 FL (ref 7.4–10.4)
POTASSIUM SERPL-SCNC: 4.1 MMOL/L (ref 3.5–5.1)
PROT SERPL-MCNC: 6.7 GM/DL (ref 5.8–7.6)
RBC # BLD AUTO: 4.42 X10(6)/MCL (ref 4.2–5.4)
SODIUM SERPL-SCNC: 137 MMOL/L (ref 136–145)
TRIGL SERPL-MCNC: 100 MG/DL (ref 37–140)
TSH SERPL-ACNC: 1.33 UIU/ML (ref 0.35–4.94)
VLDLC SERPL CALC-MCNC: 20 MG/DL
WBC # BLD AUTO: 5.77 X10(3)/MCL (ref 4.5–11.5)

## 2024-07-26 PROCEDURE — 80061 LIPID PANEL: CPT

## 2024-07-26 PROCEDURE — 85025 COMPLETE CBC W/AUTO DIFF WBC: CPT

## 2024-07-26 PROCEDURE — 80053 COMPREHEN METABOLIC PANEL: CPT

## 2024-07-26 PROCEDURE — 84443 ASSAY THYROID STIM HORMONE: CPT

## 2024-07-26 PROCEDURE — 36415 COLL VENOUS BLD VENIPUNCTURE: CPT

## 2024-07-29 ENCOUNTER — OFFICE VISIT (OUTPATIENT)
Dept: INTERNAL MEDICINE | Facility: CLINIC | Age: 79
End: 2024-07-29
Payer: MEDICARE

## 2024-07-29 VITALS
HEIGHT: 66 IN | BODY MASS INDEX: 23.3 KG/M2 | OXYGEN SATURATION: 98 % | HEART RATE: 72 BPM | DIASTOLIC BLOOD PRESSURE: 76 MMHG | RESPIRATION RATE: 18 BRPM | SYSTOLIC BLOOD PRESSURE: 124 MMHG | WEIGHT: 145 LBS

## 2024-07-29 DIAGNOSIS — E04.2 GOITER, NONTOXIC, MULTINODULAR: ICD-10-CM

## 2024-07-29 DIAGNOSIS — E78.5 HYPERLIPIDEMIA, UNSPECIFIED HYPERLIPIDEMIA TYPE: Primary | ICD-10-CM

## 2024-07-29 DIAGNOSIS — I10 ESSENTIAL (PRIMARY) HYPERTENSION: ICD-10-CM

## 2024-07-29 DIAGNOSIS — Z95.0 CARDIAC PACEMAKER IN SITU: ICD-10-CM

## 2024-07-29 PROCEDURE — 99213 OFFICE O/P EST LOW 20 MIN: CPT | Mod: ,,, | Performed by: INTERNAL MEDICINE

## 2024-07-29 RX ORDER — LISINOPRIL AND HYDROCHLOROTHIAZIDE 12.5; 2 MG/1; MG/1
1 TABLET ORAL DAILY
Qty: 90 TABLET | Refills: 3 | Status: SHIPPED | OUTPATIENT
Start: 2024-07-29

## 2024-07-29 RX ORDER — METOPROLOL SUCCINATE 50 MG/1
50 TABLET, EXTENDED RELEASE ORAL DAILY
Start: 2024-07-29

## 2024-07-29 RX ORDER — AMLODIPINE BESYLATE 2.5 MG/1
2.5 TABLET ORAL DAILY
Qty: 90 TABLET | Refills: 3 | Status: SHIPPED | OUTPATIENT
Start: 2024-07-29

## 2024-07-29 RX ORDER — PRAVASTATIN SODIUM 40 MG/1
40 TABLET ORAL NIGHTLY
Qty: 90 TABLET | Refills: 3 | Status: SHIPPED | OUTPATIENT
Start: 2024-07-29

## 2024-09-16 ENCOUNTER — LAB VISIT (OUTPATIENT)
Dept: LAB | Facility: HOSPITAL | Age: 79
End: 2024-09-16
Attending: NURSE PRACTITIONER
Payer: MEDICARE

## 2024-09-16 DIAGNOSIS — I49.3 VENTRICULAR PREMATURE BEATS: Primary | ICD-10-CM

## 2024-09-16 LAB
T3FREE SERPL-MCNC: 2.69 PG/ML (ref 1.58–3.91)
T4 FREE SERPL-MCNC: 0.9 NG/DL (ref 0.7–1.48)
TSH SERPL-ACNC: 1.08 UIU/ML (ref 0.35–4.94)

## 2024-09-16 PROCEDURE — 84481 FREE ASSAY (FT-3): CPT

## 2024-09-16 PROCEDURE — 84443 ASSAY THYROID STIM HORMONE: CPT | Mod: GA

## 2024-09-16 PROCEDURE — 84439 ASSAY OF FREE THYROXINE: CPT | Mod: GA

## 2024-09-16 PROCEDURE — 36415 COLL VENOUS BLD VENIPUNCTURE: CPT

## 2025-01-24 ENCOUNTER — TELEPHONE (OUTPATIENT)
Dept: INTERNAL MEDICINE | Facility: CLINIC | Age: 80
End: 2025-01-24
Payer: MEDICARE

## 2025-01-24 NOTE — TELEPHONE ENCOUNTER
----- Message from Nurse Eva sent at 1/24/2025  9:52 AM CST -----  Regarding: becca Mcdonnell 01/27 @2:40  Fasting labs needed.

## 2025-01-27 ENCOUNTER — OFFICE VISIT (OUTPATIENT)
Dept: INTERNAL MEDICINE | Facility: CLINIC | Age: 80
End: 2025-01-27
Payer: MEDICARE

## 2025-01-27 ENCOUNTER — LAB VISIT (OUTPATIENT)
Dept: LAB | Facility: HOSPITAL | Age: 80
End: 2025-01-27
Attending: INTERNAL MEDICINE
Payer: MEDICARE

## 2025-01-27 VITALS
SYSTOLIC BLOOD PRESSURE: 124 MMHG | WEIGHT: 148.81 LBS | HEIGHT: 66 IN | HEART RATE: 76 BPM | BODY MASS INDEX: 23.92 KG/M2 | RESPIRATION RATE: 18 BRPM | OXYGEN SATURATION: 100 % | DIASTOLIC BLOOD PRESSURE: 78 MMHG

## 2025-01-27 DIAGNOSIS — E04.2 GOITER, NONTOXIC, MULTINODULAR: ICD-10-CM

## 2025-01-27 DIAGNOSIS — I10 ESSENTIAL (PRIMARY) HYPERTENSION: ICD-10-CM

## 2025-01-27 DIAGNOSIS — Z95.0 CARDIAC PACEMAKER IN SITU: ICD-10-CM

## 2025-01-27 DIAGNOSIS — Z00.00 WELLNESS EXAMINATION: Primary | ICD-10-CM

## 2025-01-27 DIAGNOSIS — Z00.00 WELLNESS EXAMINATION: ICD-10-CM

## 2025-01-27 DIAGNOSIS — I10 PRIMARY HYPERTENSION: ICD-10-CM

## 2025-01-27 DIAGNOSIS — E78.5 HYPERLIPIDEMIA, UNSPECIFIED HYPERLIPIDEMIA TYPE: ICD-10-CM

## 2025-01-27 DIAGNOSIS — E83.52 HYPERCALCEMIA: ICD-10-CM

## 2025-01-27 LAB
ALBUMIN SERPL-MCNC: 3.9 G/DL (ref 3.4–4.8)
ALBUMIN/GLOB SERPL: 1.1 RATIO (ref 1.1–2)
ALP SERPL-CCNC: 58 UNIT/L (ref 40–150)
ALT SERPL-CCNC: 17 UNIT/L (ref 0–55)
ANION GAP SERPL CALC-SCNC: 8 MEQ/L
AST SERPL-CCNC: 25 UNIT/L (ref 5–34)
BASOPHILS # BLD AUTO: 0.04 X10(3)/MCL
BASOPHILS NFR BLD AUTO: 0.6 %
BILIRUB SERPL-MCNC: 0.4 MG/DL
BUN SERPL-MCNC: 25.7 MG/DL (ref 9.8–20.1)
CALCIUM SERPL-MCNC: 10.4 MG/DL (ref 8.4–10.2)
CHLORIDE SERPL-SCNC: 106 MMOL/L (ref 98–107)
CHOLEST SERPL-MCNC: 176 MG/DL
CHOLEST/HDLC SERPL: 3 {RATIO} (ref 0–5)
CO2 SERPL-SCNC: 29 MMOL/L (ref 23–31)
CREAT SERPL-MCNC: 1.15 MG/DL (ref 0.55–1.02)
CREAT/UREA NIT SERPL: 22
EOSINOPHIL # BLD AUTO: 0.11 X10(3)/MCL (ref 0–0.9)
EOSINOPHIL NFR BLD AUTO: 1.5 %
ERYTHROCYTE [DISTWIDTH] IN BLOOD BY AUTOMATED COUNT: 13.2 % (ref 11.5–17)
GFR SERPLBLD CREATININE-BSD FMLA CKD-EPI: 48 ML/MIN/1.73/M2
GLOBULIN SER-MCNC: 3.5 GM/DL (ref 2.4–3.5)
GLUCOSE SERPL-MCNC: 95 MG/DL (ref 82–115)
HCT VFR BLD AUTO: 40.1 % (ref 37–47)
HDLC SERPL-MCNC: 67 MG/DL (ref 35–60)
HGB BLD-MCNC: 13 G/DL (ref 12–16)
IMM GRANULOCYTES # BLD AUTO: 0.03 X10(3)/MCL (ref 0–0.04)
IMM GRANULOCYTES NFR BLD AUTO: 0.4 %
LDLC SERPL CALC-MCNC: 87 MG/DL (ref 50–140)
LYMPHOCYTES # BLD AUTO: 1.41 X10(3)/MCL (ref 0.6–4.6)
LYMPHOCYTES NFR BLD AUTO: 19.7 %
MCH RBC QN AUTO: 30.1 PG (ref 27–31)
MCHC RBC AUTO-ENTMCNC: 32.4 G/DL (ref 33–36)
MCV RBC AUTO: 92.8 FL (ref 80–94)
MONOCYTES # BLD AUTO: 0.69 X10(3)/MCL (ref 0.1–1.3)
MONOCYTES NFR BLD AUTO: 9.6 %
NEUTROPHILS # BLD AUTO: 4.88 X10(3)/MCL (ref 2.1–9.2)
NEUTROPHILS NFR BLD AUTO: 68.2 %
NRBC BLD AUTO-RTO: 0 %
PLATELET # BLD AUTO: 163 X10(3)/MCL (ref 130–400)
PMV BLD AUTO: 9.7 FL (ref 7.4–10.4)
POTASSIUM SERPL-SCNC: 4.4 MMOL/L (ref 3.5–5.1)
PROT SERPL-MCNC: 7.4 GM/DL (ref 5.8–7.6)
RBC # BLD AUTO: 4.32 X10(6)/MCL (ref 4.2–5.4)
SODIUM SERPL-SCNC: 143 MMOL/L (ref 136–145)
TRIGL SERPL-MCNC: 110 MG/DL (ref 37–140)
TSH SERPL-ACNC: 1.44 UIU/ML (ref 0.35–4.94)
VLDLC SERPL CALC-MCNC: 22 MG/DL
WBC # BLD AUTO: 7.16 X10(3)/MCL (ref 4.5–11.5)

## 2025-01-27 PROCEDURE — 85025 COMPLETE CBC W/AUTO DIFF WBC: CPT

## 2025-01-27 PROCEDURE — 36415 COLL VENOUS BLD VENIPUNCTURE: CPT

## 2025-01-27 PROCEDURE — 80061 LIPID PANEL: CPT

## 2025-01-27 PROCEDURE — 84443 ASSAY THYROID STIM HORMONE: CPT

## 2025-01-27 PROCEDURE — 80053 COMPREHEN METABOLIC PANEL: CPT

## 2025-01-27 RX ORDER — LISINOPRIL AND HYDROCHLOROTHIAZIDE 12.5; 2 MG/1; MG/1
1 TABLET ORAL DAILY
Qty: 90 TABLET | Refills: 3 | Status: SHIPPED | OUTPATIENT
Start: 2025-01-27

## 2025-01-27 RX ORDER — PRAVASTATIN SODIUM 40 MG/1
40 TABLET ORAL NIGHTLY
Qty: 90 TABLET | Refills: 3 | Status: SHIPPED | OUTPATIENT
Start: 2025-01-27

## 2025-01-27 RX ORDER — AMLODIPINE BESYLATE 2.5 MG/1
2.5 TABLET ORAL DAILY
Qty: 90 TABLET | Refills: 3 | Status: SHIPPED | OUTPATIENT
Start: 2025-01-27

## 2025-01-27 NOTE — PROGRESS NOTES
"   Internal Medicine      Patient ID: [id number]     Chief Complaint: Medicare Annual Wellness     HPI:     Jos Turcios is a 79 y.o. female here today for a Medicare Annual Wellness visit and comprehensive Health Risk Assessment.  He is also in to follow-up with numerous medical problems.    She tells me she did have a bone density study done less than 2 years ago.  She thinks it was fine.  Done by her gynecologist.  She also tells me that she did receive a shot once presumably Prolia but she did not continue it.    No history of hypercalcemia that she is aware of although it has been mildly elevated in the past.  She had done on supplements.  He does not use that much dairy.    She is concerned about generalized hair loss in her head.      The following components were reviewed and updated:  Medical history  Family History  Social history  Allergies  Immunizations  Health Maintenance  Patient Care Team  Current Outpatient Medications   Medication Instructions    amLODIPine (NORVASC) 2.5 mg, Oral, Daily    flecainide (TAMBOCOR) 50 mg, 2 times daily    lisinopriL-hydrochlorothiazide (PRINZIDE,ZESTORETIC) 20-12.5 mg per tablet 1 tablet, Oral, Daily    metoprolol succinate (TOPROL-XL) 50 mg, Oral, Daily    pravastatin (PRAVACHOL) 40 mg, Oral, Nightly       Subjective:     Review of Systems    12 point review of systems conducted, negative except as stated in the history of present illness. See HPI for details.    Objective:     Visit Vitals  /78 (BP Location: Left arm, Patient Position: Sitting)   Pulse 76   Resp 18   Ht 5' 6" (1.676 m)   Wt 67.5 kg (148 lb 12.8 oz)   SpO2 100%   BMI 24.02 kg/m²       Physical Exam  Vitals reviewed.   Constitutional:       Appearance: Normal appearance.   HENT:      Head: Normocephalic.      Nose: Nose normal.      Mouth/Throat:      Pharynx: Oropharynx is clear.   Eyes:      Pupils: Pupils are equal, round, and reactive to light.   Neck:      Thyroid: No thyromegaly. "   Cardiovascular:      Rate and Rhythm: Normal rate and regular rhythm.      Pulses: Normal pulses.   Pulmonary:      Breath sounds: Normal breath sounds.   Abdominal:      General: Abdomen is flat. Bowel sounds are normal.      Palpations: Abdomen is soft. There is no hepatomegaly, splenomegaly or mass.      Tenderness: There is no abdominal tenderness.   Musculoskeletal:      Cervical back: Neck supple.   Lymphadenopathy:      Cervical: No cervical adenopathy.   Skin:     General: Skin is warm and dry.   Neurological:      Mental Status: She is alert.     Lab work reviewed    Assessment:     1. Wellness     2. Hyperlipidemia.  Good control on current meds     3. History of cardiac pacemaker.  Followed by Cardiology     3. Hypertension.  Good control     4. History of multinodular goiter.  Nothing by exam and clinically and chemically euthyroid.  She does follow-up with her ENT doctor, Dr. Sigala on a yearly basis.      5. Hypercalcemia.  Mild and nonspecific.  Doubt but consider hyperparathyroidism.  Need to know what her bone density study has shown.  We have a bad copy from 2022.    Plan:     Continue same meds.  Follow-up 6 months with CBC CMP lipid TSH and PTH level.    We will try to get bone density study results from her gynecologist.    [] ACP Discussed - Has Living Will and HCPOA on file. Will provide our office with copies.  [x] ACP Discussion declined.    A comprehensive HEALTH RISK ASSESSMENT was completed today. Results are summarized below:                  The patient is NOT A TOBACCO USER.        All Questions regarding food, transportation or housing were not answered today.    Orders Placed This Encounter   Procedures    CBC Auto Differential     Standing Status:   Future     Standing Expiration Date:   1/27/2026    Comprehensive Metabolic Panel     Standing Status:   Future     Standing Expiration Date:   1/27/2026    Lipid Panel     Standing Status:   Future     Standing Expiration Date:    1/27/2026    TSH     Standing Status:   Future     Standing Expiration Date:   1/27/2026    Urinalysis     Standing Status:   Future     Number of Occurrences:   1     Standing Expiration Date:   1/27/2026    CBC Auto Differential     Standing Status:   Future     Standing Expiration Date:   1/27/2026    Comprehensive Metabolic Panel     Standing Status:   Future     Standing Expiration Date:   1/27/2026    Lipid Panel     Standing Status:   Future     Standing Expiration Date:   1/27/2026    TSH     Standing Status:   Future     Standing Expiration Date:   1/27/2026    PTH, Intact     Standing Status:   Future     Standing Expiration Date:   4/27/2026        Medication List with Changes/Refills   Current Medications    FLECAINIDE (TAMBOCOR) 50 MG TAB    50 mg 2 (two) times daily.       Start Date: 9/7/2022  End Date: --    METOPROLOL SUCCINATE (TOPROL-XL) 50 MG 24 HR TABLET    Take 1 tablet (50 mg total) by mouth once daily.       Start Date: 7/29/2024 End Date: --   Changed and/or Refilled Medications    Modified Medication Previous Medication    AMLODIPINE (NORVASC) 2.5 MG TABLET amLODIPine (NORVASC) 2.5 MG tablet       Take 1 tablet (2.5 mg total) by mouth once daily.    Take 1 tablet (2.5 mg total) by mouth once daily.       Start Date: 1/27/2025 End Date: --    Start Date: 7/29/2024 End Date: 1/27/2025    LISINOPRIL-HYDROCHLOROTHIAZIDE (PRINZIDE,ZESTORETIC) 20-12.5 MG PER TABLET lisinopriL-hydrochlorothiazide (PRINZIDE,ZESTORETIC) 20-12.5 mg per tablet       Take 1 tablet by mouth once daily.    Take 1 tablet by mouth once daily.       Start Date: 1/27/2025 End Date: --    Start Date: 7/29/2024 End Date: 1/27/2025    PRAVASTATIN (PRAVACHOL) 40 MG TABLET pravastatin (PRAVACHOL) 40 MG tablet       Take 1 tablet (40 mg total) by mouth every evening.    Take 1 tablet (40 mg total) by mouth every evening.       Start Date: 1/27/2025 End Date: --    Start Date: 7/29/2024 End Date: 1/27/2025        Provided patient  with a 5-10 year written screening schedule and personal prevention plan. Recommendations were developed using the USPSTF age appropriate recommendations. Education, counseling, and referrals were provided as needed. After Visit Summary printed and given to patient, which includes a list of additional screenings\tests needed.    Follow up in about 6 months (around 7/27/2025). In addition to their scheduled follow up, the patient has also been instructed to follow up on as needed basis.     No future appointments.     Kolton Smith MD

## 2025-04-22 LAB — BMD RECOMMENDATION EXT: NORMAL

## 2025-07-23 ENCOUNTER — TELEPHONE (OUTPATIENT)
Dept: INTERNAL MEDICINE | Facility: CLINIC | Age: 80
End: 2025-07-23
Payer: MEDICARE

## 2025-07-23 NOTE — TELEPHONE ENCOUNTER
----- Message from Nurse Eva sent at 7/23/2025 11:49 AM CDT -----  Regarding: becca Elder 07/29 @3:20  Fasting labs needed.

## 2025-07-28 ENCOUNTER — LAB VISIT (OUTPATIENT)
Dept: LAB | Facility: HOSPITAL | Age: 80
End: 2025-07-28
Attending: INTERNAL MEDICINE
Payer: MEDICARE

## 2025-07-28 DIAGNOSIS — E04.2 GOITER, NONTOXIC, MULTINODULAR: ICD-10-CM

## 2025-07-28 DIAGNOSIS — Z00.00 WELLNESS EXAMINATION: ICD-10-CM

## 2025-07-28 DIAGNOSIS — E78.5 HYPERLIPIDEMIA, UNSPECIFIED HYPERLIPIDEMIA TYPE: ICD-10-CM

## 2025-07-28 DIAGNOSIS — I10 ESSENTIAL (PRIMARY) HYPERTENSION: ICD-10-CM

## 2025-07-28 DIAGNOSIS — Z95.0 CARDIAC PACEMAKER IN SITU: ICD-10-CM

## 2025-07-28 DIAGNOSIS — E83.52 HYPERCALCEMIA: ICD-10-CM

## 2025-07-28 LAB
ALBUMIN SERPL-MCNC: 3.6 G/DL (ref 3.4–4.8)
ALBUMIN/GLOB SERPL: 1 RATIO (ref 1.1–2)
ALP SERPL-CCNC: 63 UNIT/L (ref 40–150)
ALT SERPL-CCNC: 18 UNIT/L (ref 0–55)
ANION GAP SERPL CALC-SCNC: 7 MEQ/L
AST SERPL-CCNC: 23 UNIT/L (ref 11–45)
BASOPHILS # BLD AUTO: 0.05 X10(3)/MCL
BASOPHILS NFR BLD AUTO: 0.9 %
BILIRUB SERPL-MCNC: 0.6 MG/DL
BUN SERPL-MCNC: 24.8 MG/DL (ref 9.8–20.1)
CALCIUM SERPL-MCNC: 10 MG/DL (ref 8.4–10.2)
CHLORIDE SERPL-SCNC: 104 MMOL/L (ref 98–107)
CHOLEST SERPL-MCNC: 151 MG/DL
CHOLEST/HDLC SERPL: 2 {RATIO} (ref 0–5)
CO2 SERPL-SCNC: 29 MMOL/L (ref 23–31)
CREAT SERPL-MCNC: 1.04 MG/DL (ref 0.55–1.02)
CREAT/UREA NIT SERPL: 24
EOSINOPHIL # BLD AUTO: 0.13 X10(3)/MCL (ref 0–0.9)
EOSINOPHIL NFR BLD AUTO: 2.3 %
ERYTHROCYTE [DISTWIDTH] IN BLOOD BY AUTOMATED COUNT: 13.2 % (ref 11.5–17)
GFR SERPLBLD CREATININE-BSD FMLA CKD-EPI: 54 ML/MIN/1.73/M2
GLOBULIN SER-MCNC: 3.6 GM/DL (ref 2.4–3.5)
GLUCOSE SERPL-MCNC: 97 MG/DL (ref 82–115)
HCT VFR BLD AUTO: 40.8 % (ref 37–47)
HDLC SERPL-MCNC: 63 MG/DL (ref 35–60)
HGB BLD-MCNC: 13 G/DL (ref 12–16)
IMM GRANULOCYTES # BLD AUTO: 0.01 X10(3)/MCL (ref 0–0.04)
IMM GRANULOCYTES NFR BLD AUTO: 0.2 %
LDLC SERPL CALC-MCNC: 69 MG/DL (ref 50–140)
LYMPHOCYTES # BLD AUTO: 1.7 X10(3)/MCL (ref 0.6–4.6)
LYMPHOCYTES NFR BLD AUTO: 29.5 %
MCH RBC QN AUTO: 29.3 PG (ref 27–31)
MCHC RBC AUTO-ENTMCNC: 31.9 G/DL (ref 33–36)
MCV RBC AUTO: 91.9 FL (ref 80–94)
MONOCYTES # BLD AUTO: 0.68 X10(3)/MCL (ref 0.1–1.3)
MONOCYTES NFR BLD AUTO: 11.8 %
NEUTROPHILS # BLD AUTO: 3.2 X10(3)/MCL (ref 2.1–9.2)
NEUTROPHILS NFR BLD AUTO: 55.3 %
NRBC BLD AUTO-RTO: 0 %
PLATELET # BLD AUTO: 172 X10(3)/MCL (ref 130–400)
PMV BLD AUTO: 9.5 FL (ref 7.4–10.4)
POTASSIUM SERPL-SCNC: 4.1 MMOL/L (ref 3.5–5.1)
PROT SERPL-MCNC: 7.2 GM/DL (ref 5.8–7.6)
PTH-INTACT SERPL-MCNC: 101.3 PG/ML (ref 8.7–77)
RBC # BLD AUTO: 4.44 X10(6)/MCL (ref 4.2–5.4)
SODIUM SERPL-SCNC: 140 MMOL/L (ref 136–145)
TRIGL SERPL-MCNC: 94 MG/DL (ref 37–140)
TSH SERPL-ACNC: 1.91 UIU/ML (ref 0.35–4.94)
VLDLC SERPL CALC-MCNC: 19 MG/DL
WBC # BLD AUTO: 5.77 X10(3)/MCL (ref 4.5–11.5)

## 2025-07-28 PROCEDURE — 80053 COMPREHEN METABOLIC PANEL: CPT

## 2025-07-28 PROCEDURE — 84443 ASSAY THYROID STIM HORMONE: CPT

## 2025-07-28 PROCEDURE — 83970 ASSAY OF PARATHORMONE: CPT

## 2025-07-28 PROCEDURE — 36415 COLL VENOUS BLD VENIPUNCTURE: CPT

## 2025-07-28 PROCEDURE — 85025 COMPLETE CBC W/AUTO DIFF WBC: CPT

## 2025-07-28 PROCEDURE — 80061 LIPID PANEL: CPT

## 2025-07-29 ENCOUNTER — OFFICE VISIT (OUTPATIENT)
Dept: INTERNAL MEDICINE | Facility: CLINIC | Age: 80
End: 2025-07-29
Payer: MEDICARE

## 2025-07-29 VITALS
OXYGEN SATURATION: 98 % | DIASTOLIC BLOOD PRESSURE: 67 MMHG | SYSTOLIC BLOOD PRESSURE: 129 MMHG | HEART RATE: 72 BPM | RESPIRATION RATE: 18 BRPM | HEIGHT: 66 IN | WEIGHT: 150 LBS | BODY MASS INDEX: 24.11 KG/M2

## 2025-07-29 DIAGNOSIS — Z95.0 CARDIAC PACEMAKER IN SITU: ICD-10-CM

## 2025-07-29 DIAGNOSIS — E21.3 HYPERPARATHYROIDISM: ICD-10-CM

## 2025-07-29 DIAGNOSIS — E78.5 HYPERLIPIDEMIA, UNSPECIFIED HYPERLIPIDEMIA TYPE: ICD-10-CM

## 2025-07-29 DIAGNOSIS — E04.2 GOITER, NONTOXIC, MULTINODULAR: ICD-10-CM

## 2025-07-29 DIAGNOSIS — E83.52 HYPERCALCEMIA: ICD-10-CM

## 2025-07-29 DIAGNOSIS — I10 ESSENTIAL (PRIMARY) HYPERTENSION: Primary | ICD-10-CM

## 2025-07-29 PROCEDURE — 99214 OFFICE O/P EST MOD 30 MIN: CPT | Mod: ,,, | Performed by: INTERNAL MEDICINE

## 2025-07-29 NOTE — PROGRESS NOTES
"Patient ID: [id number]      Subjective:     Chief Complaint: Follow-up      Jos Turcios is a 79 y.o. female.  The patient is an 79 year old woman in for follow-up of multiple medical problems.  She feels okay overall.  She forgot to bring me a copy of her bone density study.  She thinks it was okay but that has a same time Prolia has been offered.    She does have a history of thyroid nodule status post biopsy by Dr. Sigala who follows her yearly.    Follow-up        Review of Systems    Outpatient Medications Marked as Taking for the 7/29/25 encounter (Office Visit) with Kolton Smith MD   Medication Sig Dispense Refill    amLODIPine (NORVASC) 2.5 MG tablet Take 1 tablet (2.5 mg total) by mouth once daily. 90 tablet 3    flecainide (TAMBOCOR) 50 MG Tab 50 mg 2 (two) times daily.      lisinopriL-hydrochlorothiazide (PRINZIDE,ZESTORETIC) 20-12.5 mg per tablet Take 1 tablet by mouth once daily. 90 tablet 3    metoprolol succinate (TOPROL-XL) 50 MG 24 hr tablet Take 1 tablet (50 mg total) by mouth once daily.      pravastatin (PRAVACHOL) 40 MG tablet Take 1 tablet (40 mg total) by mouth every evening. 90 tablet 3       Objective:     /67 (BP Location: Right arm, Patient Position: Sitting)   Pulse 72   Resp 18   Ht 5' 6" (1.676 m)   Wt 68 kg (150 lb)   SpO2 98%   BMI 24.21 kg/m²     Physical Exam  Vitals reviewed.   Constitutional:       Appearance: Normal appearance.   HENT:      Head: Normocephalic.      Nose: Nose normal.      Mouth/Throat:      Pharynx: Oropharynx is clear.   Eyes:      Pupils: Pupils are equal, round, and reactive to light.   Neck:      Thyroid: No thyromegaly.   Cardiovascular:      Rate and Rhythm: Normal rate and regular rhythm.      Pulses: Normal pulses.   Pulmonary:      Breath sounds: Normal breath sounds.   Abdominal:      General: Abdomen is flat. Bowel sounds are normal.      Palpations: Abdomen is soft. There is no hepatomegaly, splenomegaly or mass.      Tenderness: " There is no abdominal tenderness.   Musculoskeletal:      Cervical back: Neck supple.   Lymphadenopathy:      Cervical: No cervical adenopathy.   Skin:     General: Skin is warm and dry.   Neurological:      Mental Status: She is alert.     Lab work reviewed    Assessment:     1. Hypertension.  Good control     2. Hyperlipidemia.  Improved control.  On relatively low-dose statin     3. History of goiter.  Nothing by exam     4. Hyperparathyroidism.  Calcium now normal.  I am still trying to get the results of bone density tests.    5. History of pacemaker.  Followed by Cardiology    Plan:   Continue current meds TLC etc..  She will get me the bone density report.  Follow-up with me 6 months for wellness check with CBC CMP lipid TSH PTH level prior  Problem List Items Addressed This Visit          Cardiac/Vascular    Cardiac pacemaker in situ    Relevant Orders    CBC Auto Differential    Comprehensive Metabolic Panel    Lipid Panel    TSH    PTH, Intact    Hyperlipidemia    Relevant Orders    CBC Auto Differential    Comprehensive Metabolic Panel    Lipid Panel    TSH    PTH, Intact       Renal/    Hypercalcemia    Relevant Orders    CBC Auto Differential    Comprehensive Metabolic Panel    Lipid Panel    TSH    PTH, Intact       Endocrine    Hyperparathyroidism    Relevant Orders    CBC Auto Differential    Comprehensive Metabolic Panel    Lipid Panel    TSH    PTH, Intact    Goiter, nontoxic, multinodular    Relevant Orders    CBC Auto Differential    Comprehensive Metabolic Panel    Lipid Panel    TSH    PTH, Intact     Other Visit Diagnoses         Essential (primary) hypertension    -  Primary    Relevant Orders    CBC Auto Differential    Comprehensive Metabolic Panel    Lipid Panel    TSH    PTH, Intact             Orders Placed This Encounter   Procedures    CBC Auto Differential     Standing Status:   Future     Expected Date:   1/29/2026     Expiration Date:   7/29/2026    Comprehensive Metabolic Panel      Standing Status:   Future     Expected Date:   1/29/2026     Expiration Date:   7/29/2026    Lipid Panel     Standing Status:   Future     Expected Date:   1/29/2026     Expiration Date:   7/29/2026    TSH     Standing Status:   Future     Expected Date:   1/29/2026     Expiration Date:   7/29/2026    PTH, Intact     Standing Status:   Future     Expected Date:   1/29/2026     Expiration Date:   10/27/2026        Medication List with Changes/Refills   Current Medications    AMLODIPINE (NORVASC) 2.5 MG TABLET    Take 1 tablet (2.5 mg total) by mouth once daily.       Start Date: 1/27/2025 End Date: --    FLECAINIDE (TAMBOCOR) 50 MG TAB    50 mg 2 (two) times daily.       Start Date: 9/7/2022  End Date: --    LISINOPRIL-HYDROCHLOROTHIAZIDE (PRINZIDE,ZESTORETIC) 20-12.5 MG PER TABLET    Take 1 tablet by mouth once daily.       Start Date: 1/27/2025 End Date: --    METOPROLOL SUCCINATE (TOPROL-XL) 50 MG 24 HR TABLET    Take 1 tablet (50 mg total) by mouth once daily.       Start Date: 7/29/2024 End Date: --    PRAVASTATIN (PRAVACHOL) 40 MG TABLET    Take 1 tablet (40 mg total) by mouth every evening.       Start Date: 1/27/2025 End Date: --            Follow up in about 6 months (around 1/29/2026). In addition to their scheduled follow up, the patient has also been instructed to follow up on as needed basis.       Kolton Smith

## 2025-07-30 ENCOUNTER — DOCUMENTATION ONLY (OUTPATIENT)
Dept: INTERNAL MEDICINE | Facility: CLINIC | Age: 80
End: 2025-07-30
Payer: MEDICARE

## 2025-08-04 ENCOUNTER — TELEPHONE (OUTPATIENT)
Dept: INTERNAL MEDICINE | Facility: CLINIC | Age: 80
End: 2025-08-04
Payer: MEDICARE

## 2025-08-04 DIAGNOSIS — E21.3 HYPERPARATHYROIDISM: Primary | ICD-10-CM

## 2025-08-04 NOTE — TELEPHONE ENCOUNTER
Patient advised of results, verbalized understanding, US order sent to [facility], they will contact patient to schedule appt.

## 2025-08-04 NOTE — TELEPHONE ENCOUNTER
Copied from CRM #[id number]. Topic: General Inquiry - Return Call  >> Aug 4, 2025  3:50 PM Americo wrote:  .Who Called: Jos Turcios    Patient is returning phone call    Who Left Message for Patient:Eva  Does the patient know what this is regarding?:na      Preferred Method of Contact: Phone Call  Patient's Preferred Phone Number on File: [phone]   Best Call Back Number, if different:  Additional Information: pt returning call to Eva

## 2025-08-04 NOTE — TELEPHONE ENCOUNTER
I reviewed results of bone density study done in April.  The hips were fine but she does have osteoporosis of the spine.  For this reason and I think the hyperparathyroidism should be further evaluated.  Recommend we do an ultrasound of the parathyroid gland.  Please call her.  I ordered the US

## 2025-08-04 NOTE — TELEPHONE ENCOUNTER
Patient returned the call, advised of Dr. Smith's results and recommendation.  Patient agreed to the US, order sent to [facility], they will contact her to schedule the appt.

## 2025-08-08 ENCOUNTER — TELEPHONE (OUTPATIENT)
Dept: INTERNAL MEDICINE | Facility: CLINIC | Age: 80
End: 2025-08-08
Payer: MEDICARE

## 2025-08-08 NOTE — TELEPHONE ENCOUNTER
----- Message from Kolton Smith MD sent at 8/7/2025  4:49 PM CDT -----  Tell her that the ultrasound of the neck shows a fairly large thyroid nodule.  No clear-cut parathyroid nodules.  I would like her assessed by an endocrinologist.  I recommend Dr. Chidi Hernandez   ----- Message -----  From: Interface, Rad Results In  Sent: 8/7/2025   3:19 PM CDT  To: Kolton Smith MD

## 2025-08-08 NOTE — TELEPHONE ENCOUNTER
Patient advised of results, patient stated she sees Dr. Sigala for the issue and has a f/u appt later in this month.  Patient requested results be faxed to his office.  Done per request.

## 2025-08-08 NOTE — TELEPHONE ENCOUNTER
Copied from CRM #[id number]. Topic: General Inquiry - Return Call  >> Aug 8, 2025  9:26 AM Pamela wrote:  Who Called: Taniasandra Turcios    Patient is returning phone call    Who Left Message for Patient:Eva Rapp LPN  Does the patient know what this is regarding?:n/a      Preferred Method of Contact: Phone Call  Patient's Preferred Phone Number on File: [phone]   Best Call Back Number, if different:  Additional Information:

## 2025-08-11 ENCOUNTER — TELEPHONE (OUTPATIENT)
Dept: INTERNAL MEDICINE | Facility: CLINIC | Age: 80
End: 2025-08-11
Payer: MEDICARE

## (undated) DEVICE — ILLUMINATOR PHOTONBLADE 8/11IN

## (undated) DEVICE — SUT VICRYL PLUS 2-0 CT1 18

## (undated) DEVICE — PACK PACEMAKER

## (undated) DEVICE — ELECTRODE DEFIB NON-RADIOPAQUE

## (undated) DEVICE — ADHESIVE DERMABOND ADVANCED

## (undated) DEVICE — DRAPE IOBAN 2 STERI

## (undated) DEVICE — CANNULA NASAL ADULT

## (undated) DEVICE — SUT CTD VICRYL PLUS 4/0